# Patient Record
Sex: FEMALE | Race: WHITE | NOT HISPANIC OR LATINO | Employment: OTHER | URBAN - METROPOLITAN AREA
[De-identification: names, ages, dates, MRNs, and addresses within clinical notes are randomized per-mention and may not be internally consistent; named-entity substitution may affect disease eponyms.]

---

## 2017-01-27 ENCOUNTER — OFFICE VISIT - HEALTHEAST (OUTPATIENT)
Dept: INTERNAL MEDICINE | Facility: CLINIC | Age: 62
End: 2017-01-27

## 2017-01-27 DIAGNOSIS — J20.9 BRONCHITIS, ACUTE: ICD-10-CM

## 2017-01-27 ASSESSMENT — MIFFLIN-ST. JEOR: SCORE: 1619.97

## 2017-02-02 ENCOUNTER — COMMUNICATION - HEALTHEAST (OUTPATIENT)
Dept: INTERNAL MEDICINE | Facility: CLINIC | Age: 62
End: 2017-02-02

## 2017-02-06 ENCOUNTER — COMMUNICATION - HEALTHEAST (OUTPATIENT)
Dept: INTERNAL MEDICINE | Facility: CLINIC | Age: 62
End: 2017-02-06

## 2017-03-06 ENCOUNTER — COMMUNICATION - HEALTHEAST (OUTPATIENT)
Dept: INTERNAL MEDICINE | Facility: CLINIC | Age: 62
End: 2017-03-06

## 2017-03-07 ENCOUNTER — OFFICE VISIT - HEALTHEAST (OUTPATIENT)
Dept: INTERNAL MEDICINE | Facility: CLINIC | Age: 62
End: 2017-03-07

## 2017-03-07 DIAGNOSIS — F43.29 GRIEF REACTION WITH PROLONGED BEREAVEMENT: ICD-10-CM

## 2017-03-07 DIAGNOSIS — M17.0 OSTEOARTHRITIS OF KNEES, BILATERAL: ICD-10-CM

## 2017-03-07 DIAGNOSIS — R03.0 ELEVATED BLOOD PRESSURE (NOT HYPERTENSION): ICD-10-CM

## 2017-03-07 DIAGNOSIS — F43.22 ADJUSTMENT DISORDER WITH ANXIETY: ICD-10-CM

## 2017-03-07 DIAGNOSIS — F41.1 ANXIETY STATE: ICD-10-CM

## 2017-03-15 ENCOUNTER — RECORDS - HEALTHEAST (OUTPATIENT)
Dept: ADMINISTRATIVE | Facility: OTHER | Age: 62
End: 2017-03-15

## 2017-04-25 ENCOUNTER — RECORDS - HEALTHEAST (OUTPATIENT)
Dept: ADMINISTRATIVE | Facility: OTHER | Age: 62
End: 2017-04-25

## 2017-07-24 ENCOUNTER — HOSPITAL ENCOUNTER (OUTPATIENT)
Dept: MAMMOGRAPHY | Facility: CLINIC | Age: 62
Discharge: HOME | End: 2017-07-24
Attending: OBSTETRICS & GYNECOLOGY | Admitting: OBSTETRICS & GYNECOLOGY
Payer: COMMERCIAL

## 2017-07-24 DIAGNOSIS — Z12.31 VISIT FOR SCREENING MAMMOGRAM: ICD-10-CM

## 2017-07-24 PROCEDURE — 77063 BREAST TOMOSYNTHESIS BI: CPT

## 2017-07-24 PROCEDURE — G0202 SCR MAMMO BI INCL CAD: HCPCS

## 2017-08-07 ENCOUNTER — COMMUNICATION - HEALTHEAST (OUTPATIENT)
Dept: INTERNAL MEDICINE | Facility: CLINIC | Age: 62
End: 2017-08-07

## 2017-08-10 ENCOUNTER — OFFICE VISIT - HEALTHEAST (OUTPATIENT)
Dept: INTERNAL MEDICINE | Facility: CLINIC | Age: 62
End: 2017-08-10

## 2017-08-10 DIAGNOSIS — E55.9 VITAMIN D DEFICIENCY: ICD-10-CM

## 2017-08-10 DIAGNOSIS — Z00.00 HEALTH CARE MAINTENANCE: ICD-10-CM

## 2017-08-10 DIAGNOSIS — F43.23 ADJUSTMENT DISORDER WITH MIXED ANXIETY AND DEPRESSED MOOD: ICD-10-CM

## 2017-08-10 DIAGNOSIS — R41.3 MEMORY IMPAIRMENT: ICD-10-CM

## 2017-08-10 LAB
CHOLEST SERPL-MCNC: 232 MG/DL
FASTING STATUS PATIENT QL REPORTED: NO
HDLC SERPL-MCNC: 79 MG/DL
LDLC SERPL CALC-MCNC: 126 MG/DL
TRIGL SERPL-MCNC: 137 MG/DL

## 2017-08-10 ASSESSMENT — MIFFLIN-ST. JEOR: SCORE: 1608.63

## 2017-08-11 ENCOUNTER — COMMUNICATION - HEALTHEAST (OUTPATIENT)
Dept: INTERNAL MEDICINE | Facility: CLINIC | Age: 62
End: 2017-08-11

## 2017-08-14 ENCOUNTER — COMMUNICATION - HEALTHEAST (OUTPATIENT)
Dept: INTERNAL MEDICINE | Facility: CLINIC | Age: 62
End: 2017-08-14

## 2017-08-28 ENCOUNTER — COMMUNICATION - HEALTHEAST (OUTPATIENT)
Dept: INTERNAL MEDICINE | Facility: CLINIC | Age: 62
End: 2017-08-28

## 2017-08-28 DIAGNOSIS — F41.9 ANXIETY: ICD-10-CM

## 2017-11-20 DIAGNOSIS — Z78.0 MENOPAUSE: ICD-10-CM

## 2017-11-20 NOTE — TELEPHONE ENCOUNTER
Last annual visit 8/24/16  Breast exam at visit.  Last prescription 8/24/16  Last mammo 7/2017    Future appointment currently not scheduled.    BP Readings from Last 3 Encounters:   08/24/16 129/62   02/20/15 128/61   01/31/14 120/70     Medication is being filled for 1 time refill only due to:  Patient needs to be seen because it has been more than one year since last visit.     Kelli Baumann   [facility]  RN

## 2018-01-26 DIAGNOSIS — Z78.0 MENOPAUSE: ICD-10-CM

## 2018-01-26 NOTE — TELEPHONE ENCOUNTER
Last annual visit 8/24/16  Breast exam at visit.  Last prescription 8/24/16  Last mammo 7/2017     Future appointment currently not scheduled.    BP Readings from Last 2 Encounters:   08/24/16 129/62   02/20/15 128/61     Chelsi refill given.  Patient did not follow up.    Will send to provider for consideration of refill.    Kelli Baumann   [facility]  RN

## 2018-01-29 ENCOUNTER — COMMUNICATION - HEALTHEAST (OUTPATIENT)
Dept: INTERNAL MEDICINE | Facility: CLINIC | Age: 63
End: 2018-01-29

## 2018-01-29 DIAGNOSIS — F41.9 ANXIETY: ICD-10-CM

## 2018-02-06 ENCOUNTER — COMMUNICATION - HEALTHEAST (OUTPATIENT)
Dept: INTERNAL MEDICINE | Facility: CLINIC | Age: 63
End: 2018-02-06

## 2018-03-16 ENCOUNTER — AMBULATORY - HEALTHEAST (OUTPATIENT)
Dept: INTERNAL MEDICINE | Facility: CLINIC | Age: 63
End: 2018-03-16

## 2018-03-16 ENCOUNTER — COMMUNICATION - HEALTHEAST (OUTPATIENT)
Dept: SCHEDULING | Facility: CLINIC | Age: 63
End: 2018-03-16

## 2018-03-16 DIAGNOSIS — J32.9 SINUS INFECTION: ICD-10-CM

## 2018-03-21 ENCOUNTER — COMMUNICATION - HEALTHEAST (OUTPATIENT)
Dept: SCHEDULING | Facility: CLINIC | Age: 63
End: 2018-03-21

## 2018-03-21 DIAGNOSIS — R06.2 WHEEZING: ICD-10-CM

## 2018-05-07 ENCOUNTER — COMMUNICATION - HEALTHEAST (OUTPATIENT)
Dept: INTERNAL MEDICINE | Facility: CLINIC | Age: 63
End: 2018-05-07

## 2018-05-07 ENCOUNTER — OFFICE VISIT - HEALTHEAST (OUTPATIENT)
Dept: INTERNAL MEDICINE | Facility: CLINIC | Age: 63
End: 2018-05-07

## 2018-05-07 DIAGNOSIS — F41.9 ANXIETY: ICD-10-CM

## 2018-05-07 DIAGNOSIS — Z00.00 HEALTH CARE MAINTENANCE: ICD-10-CM

## 2018-05-07 DIAGNOSIS — Z12.31 VISIT FOR SCREENING MAMMOGRAM: ICD-10-CM

## 2018-05-07 DIAGNOSIS — I83.90 VARICOSE VEIN OF LEG: ICD-10-CM

## 2018-05-07 LAB
ALBUMIN UR-MCNC: NEGATIVE MG/DL
ANION GAP SERPL CALCULATED.3IONS-SCNC: 13 MMOL/L (ref 5–18)
APPEARANCE UR: CLEAR
BILIRUB UR QL STRIP: NEGATIVE
BUN SERPL-MCNC: 11 MG/DL (ref 8–22)
CALCIUM SERPL-MCNC: 9.4 MG/DL (ref 8.5–10.5)
CHLORIDE BLD-SCNC: 102 MMOL/L (ref 98–107)
CHOLEST SERPL-MCNC: 250 MG/DL
CO2 SERPL-SCNC: 24 MMOL/L (ref 22–31)
COLOR UR AUTO: YELLOW
CREAT SERPL-MCNC: 0.72 MG/DL (ref 0.6–1.1)
ERYTHROCYTE [DISTWIDTH] IN BLOOD BY AUTOMATED COUNT: 12.5 % (ref 11–14.5)
FASTING STATUS PATIENT QL REPORTED: YES
GFR SERPL CREATININE-BSD FRML MDRD: >60 ML/MIN/1.73M2
GLUCOSE BLD-MCNC: 82 MG/DL (ref 70–125)
GLUCOSE UR STRIP-MCNC: NEGATIVE MG/DL
HCT VFR BLD AUTO: 40.5 % (ref 35–47)
HDLC SERPL-MCNC: 93 MG/DL
HGB BLD-MCNC: 13.4 G/DL (ref 12–16)
HGB UR QL STRIP: NEGATIVE
KETONES UR STRIP-MCNC: NEGATIVE MG/DL
LDLC SERPL CALC-MCNC: 128 MG/DL
LEUKOCYTE ESTERASE UR QL STRIP: NEGATIVE
MCH RBC QN AUTO: 28.6 PG (ref 27–34)
MCHC RBC AUTO-ENTMCNC: 33.1 G/DL (ref 32–36)
MCV RBC AUTO: 86 FL (ref 80–100)
NITRATE UR QL: NEGATIVE
PH UR STRIP: 6 [PH] (ref 5–8)
PLATELET # BLD AUTO: 266 THOU/UL (ref 140–440)
PMV BLD AUTO: 7.6 FL (ref 7–10)
POTASSIUM BLD-SCNC: 4.2 MMOL/L (ref 3.5–5)
RBC # BLD AUTO: 4.68 MILL/UL (ref 3.8–5.4)
SODIUM SERPL-SCNC: 139 MMOL/L (ref 136–145)
SP GR UR STRIP: 1.02 (ref 1–1.03)
TRIGL SERPL-MCNC: 143 MG/DL
TSH SERPL DL<=0.005 MIU/L-ACNC: 2.22 UIU/ML (ref 0.3–5)
UROBILINOGEN UR STRIP-ACNC: NORMAL
WBC: 6.9 THOU/UL (ref 4–11)

## 2018-05-07 ASSESSMENT — MIFFLIN-ST. JEOR: SCORE: 1630.46

## 2018-05-15 ENCOUNTER — COMMUNICATION - HEALTHEAST (OUTPATIENT)
Dept: INTERNAL MEDICINE | Facility: CLINIC | Age: 63
End: 2018-05-15

## 2018-05-15 DIAGNOSIS — K91.1 DUMPING SYNDROME: ICD-10-CM

## 2018-06-01 ENCOUNTER — COMMUNICATION - HEALTHEAST (OUTPATIENT)
Dept: SCHEDULING | Facility: CLINIC | Age: 63
End: 2018-06-01

## 2018-06-01 DIAGNOSIS — N39.0 UTI (URINARY TRACT INFECTION): ICD-10-CM

## 2018-06-18 ENCOUNTER — RECORDS - HEALTHEAST (OUTPATIENT)
Dept: ADMINISTRATIVE | Facility: OTHER | Age: 63
End: 2018-06-18

## 2018-07-03 ENCOUNTER — OFFICE VISIT (OUTPATIENT)
Dept: OBGYN | Facility: CLINIC | Age: 63
End: 2018-07-03
Payer: COMMERCIAL

## 2018-07-03 VITALS
WEIGHT: 245 LBS | SYSTOLIC BLOOD PRESSURE: 150 MMHG | TEMPERATURE: 97.9 F | BODY MASS INDEX: 39.37 KG/M2 | DIASTOLIC BLOOD PRESSURE: 70 MMHG | HEIGHT: 66 IN | HEART RATE: 58 BPM | RESPIRATION RATE: 18 BRPM

## 2018-07-03 DIAGNOSIS — R10.2 PELVIC PRESSURE IN FEMALE: Primary | ICD-10-CM

## 2018-07-03 DIAGNOSIS — R63.8 INCREASED BMI: ICD-10-CM

## 2018-07-03 DIAGNOSIS — Z79.890 HORMONE REPLACEMENT THERAPY (POSTMENOPAUSAL): ICD-10-CM

## 2018-07-03 DIAGNOSIS — R33.9 INCOMPLETE BLADDER EMPTYING: ICD-10-CM

## 2018-07-03 PROCEDURE — 99213 OFFICE O/P EST LOW 20 MIN: CPT | Performed by: OBSTETRICS & GYNECOLOGY

## 2018-07-03 RX ORDER — ESCITALOPRAM OXALATE 20 MG/1
10 TABLET ORAL AT BEDTIME
COMMUNITY

## 2018-07-03 NOTE — PROGRESS NOTES
"63 year old  here for feeling \"mushy\" in side.  She noticed things didn't feel right with intercourse starting last month.  That is the only time that she has noticed it.  He is also having health issues so don't know if that is him.  Normal bowel movements.  Feels somewhat incomplete emptying.  She just went on lexapro a year ago.  She is still on the premarin.  She is on the premarin for the vaginal dryness.    Past Medical History:   Diagnosis Date     Post-menopause on HRT (hormone replacement therapy)      Past Surgical History:   Procedure Laterality Date     GALLBLADDER SURGERY       HYSTERECTOMY, PAP NO LONGER INDICATED      menorrhagia-still has ovaries      meds-reviewed  FH and social history reviewed in EPIC   ROS: 10 point ROS neg other than the symptoms noted above in the HPI.  /70 (BP Location: Left arm, Patient Position: Chair, Cuff Size: Adult Large)  Pulse 58  Temp 97.9  F (36.6  C) (Tympanic)  Resp 18  Ht 5' 6\" (1.676 m)  Wt 245 lb (111.1 kg)  BMI 39.54 kg/m2  Alert and orientedx3, in NAD  Abdomen-soft,nontender, NABS  Pelvic exam: BUS-normal normal vagina and vulva, post hysterectomy status, vaginal cuff well elevated, obesity limits pelvic exam. Rectovaginal with no masses and excellent perineal body support  POPPQ Aa-3 Ba-2 C-7 Ap-1 Bp-2 GH3cm PB2cm  ASSESSMENT / PLAN:  (R10.2) Pelvic pressure in female  (primary encounter diagnosis)  Comment: normal bladder and no other urinary symptoms  Plan: MEASURE POST-VOID RESIDUAL URINE/BLADDER         CAPACITY, US NON-IMAGING, Escitalopram Oxalate         (LEXAPRO PO)        Stage 1 prolapse, not bothering the patient.      (R33.9) Incomplete bladder emptying  Comment: normal  Plan: MEASURE POST-VOID RESIDUAL URINE/BLADDER         CAPACITY, US NON-IMAGING, Escitalopram Oxalate         (LEXAPRO PO)            (R63.8) Increased BMI  Comment: discussed exercise and weight loss and diet.  Given information and given " referral  Plan: MEASURE POST-VOID RESIDUAL URINE/BLADDER         CAPACITY, US NON-IMAGING, Escitalopram Oxalate         (LEXAPRO PO), WEIGHT MANAGEMENT/ P LIFESTYLE         PROGRAM REFERRAL          (Z79.890) Hormone replacement therapy (postmenopausal)  Comment: discussed the risks, recommended if for vaginal dryness to switch to vaginal cream/ring or try off of it.  She declined. Discussed she could switch and use the pill 2 times per week vaginally and that would be enough to help her vaginally dryness and decrease her health risks from the estrogen  Plan: she will consider  20 minutes was spent face to face with the patient today discussing her history, diagnosis, and follow-up plan as noted above.  Over 50% of the visit was spent in counseling and coordination of care.    Total Visit Time: 20 minutes.    Danae Restrepo MD

## 2018-07-03 NOTE — MR AVS SNAPSHOT
"              After Visit Summary   7/3/2018    Rachana Quinn    MRN: [id number]           Patient Information     Date Of Birth          1955        Visit Information        Provider Department      7/3/2018 2:30 PM Danae Restrepo MD [facility]        Today's Diagnoses     Pelvic pressure in female    -  1    Incomplete bladder emptying        Increased BMI        Hormone replacement therapy (postmenopausal)           Follow-ups after your visit        Additional Services     WEIGHT MANAGEMENT/ UMP LIFESTYLE PROGRAM REFERRAL       To schedule an appointment, please call [facility] at [phone].                  Who to contact     If you have questions or need follow up information about today's clinic visit or your schedule please contact [facility] directly at [phone].  Normal or non-critical lab and imaging results will be communicated to you by MyChart, letter or phone within 4 business days after the clinic has received the results. If you do not hear from us within 7 days, please contact the clinic through MyChart or phone. If you have a critical or abnormal lab result, we will notify you by phone as soon as possible.  Submit refill requests through [employer] or call your pharmacy and they will forward the refill request to us. Please allow 3 business days for your refill to be completed.          Additional Information About Your Visit        Care EveryWhere ID     This is your Care EveryWhere ID. This could be used by other organizations to access your [place] medical records  [id number]        Your Vitals Were     Pulse Temperature Respirations Height BMI (Body Mass Index)       58 97.9  F (36.6  C) (Tympanic) 18 5' 6\" (1.676 m) 39.54 kg/m2        Blood Pressure from Last 3 Encounters:   07/03/18 150/70   08/24/16 129/62   02/20/15 128/61    Weight from Last 3 Encounters:   07/03/18 245 lb (111.1 kg)   08/24/16 224 lb 12.8 oz (102 kg) "   02/20/15 229 lb (103.9 kg)              We Performed the Following     MEASURE POST-VOID RESIDUAL URINE/BLADDER CAPACITY, US NON-IMAGING     WEIGHT MANAGEMENT/ UMP LIFESTYLE PROGRAM REFERRAL        Primary Care Provider Office Phone # Fax #    April Leo [phone]       [facility] [address] [facility] [address]        Equal Access to Services     DARYL JEFFREY : Hadii aad ku hadasho Soomaali, waaxda luqadaha, qaybta kaalmada adeegyada, waxay idiin hayaan adeeg kharash la'aan . So [facility] [phone].    ATENCIÓN: Si habla español, tiene a guzmán disposición servicios gratuitos de asistencia lingüística. Rileyame al [phone].    We comply with applicable federal civil rights laws and [place] laws. We do not discriminate on the basis of race, color, national origin, age, disability, sex, sexual orientation, or gender identity.            Thank you!     Thank you for choosing [facility]  for your care. Our goal is always to provide you with excellent care. Hearing back from our patients is one way we can continue to improve our services. Please take a few minutes to complete the written survey that you may receive in the mail after your visit with us. Thank you!             Your Updated Medication List - Protect others around you: Learn how to safely use, store and throw away your medicines at www.disposemymeds.org.          This list is accurate as of 7/3/18  3:21 PM.  Always use your most recent med list.                   Brand Name Dispense Instructions for use Diagnosis    ciclopirox 8 % Soln     1 Bottle    Apply daily, wash off every week        Ciclopirox Olamine 0.77 % Susp     1 Bottle    Externally apply 1 Application topically daily    Onychomycosis       estrogens (conjugated) 0.45 MG tablet    PREMARIN    90 tablet    Take 1 tablet (0.45 mg) by mouth daily    Menopause       LEXAPRO PO      Take 5 mg by mouth    Pelvic pressure in female, Incomplete  bladder emptying, Increased BMI       nystatin-triamcinolone cream    MYCOLOG II    30 g    Apply to areas of irritation twice daily for 2 weeks    Dermatitis, Encounter for gynecological examination without abnormal finding, Menopausal syndrome (hot flashes)       VITAMIN B COMPLEX PO           VITAMIN D PO

## 2019-02-06 ENCOUNTER — COMMUNICATION - HEALTHEAST (OUTPATIENT)
Dept: INTERNAL MEDICINE | Facility: CLINIC | Age: 64
End: 2019-02-06

## 2019-02-06 DIAGNOSIS — F41.9 ANXIETY: ICD-10-CM

## 2021-05-26 ENCOUNTER — RECORDS - HEALTHEAST (OUTPATIENT)
Dept: ADMINISTRATIVE | Facility: CLINIC | Age: 66
End: 2021-05-26

## 2021-05-28 ENCOUNTER — RECORDS - HEALTHEAST (OUTPATIENT)
Dept: ADMINISTRATIVE | Facility: CLINIC | Age: 66
End: 2021-05-28

## 2021-05-30 ENCOUNTER — RECORDS - HEALTHEAST (OUTPATIENT)
Dept: ADMINISTRATIVE | Facility: CLINIC | Age: 66
End: 2021-05-30

## 2021-05-30 VITALS — BODY MASS INDEX: 34.96 KG/M2 | WEIGHT: 229.9 LBS

## 2021-05-30 VITALS — BODY MASS INDEX: 34.63 KG/M2 | WEIGHT: 228.5 LBS | HEIGHT: 68 IN

## 2021-05-31 ENCOUNTER — RECORDS - HEALTHEAST (OUTPATIENT)
Dept: ADMINISTRATIVE | Facility: CLINIC | Age: 66
End: 2021-05-31

## 2021-05-31 VITALS — HEIGHT: 68 IN | WEIGHT: 226 LBS | BODY MASS INDEX: 34.25 KG/M2

## 2021-06-01 VITALS — WEIGHT: 236.06 LBS | BODY MASS INDEX: 37.05 KG/M2 | HEIGHT: 67 IN

## 2021-06-02 ENCOUNTER — RECORDS - HEALTHEAST (OUTPATIENT)
Dept: ADMINISTRATIVE | Facility: CLINIC | Age: 66
End: 2021-06-02

## 2021-06-08 NOTE — PROGRESS NOTES
"ASSESSMENT:  1.  Bronchitis:  Viral versus bacterial.  Since symptoms are quite severe with purulent sputum, she will be covered with an antibiotic    PLAN:  1.  Nasal smear for influenza was done.  This returned negative.  2.  Zithromax Z-Leslie  3.  Tessalon 200 mg 3 times daily as needed for cough  4.  Call for wheezing, high fevers, or if not improving in 1 week    Orders Placed This Encounter   Procedures     Influenza A/B Rapid Test     There are no discontinued medications.        ASSESSED PROBLEMS:  1. Bronchitis, acute  Influenza A/B Rapid Test    azithromycin (ZITHROMAX Z-LESLIE) 250 MG tablet    benzonatate (TESSALON) 200 MG capsule       CHIEF COMPLAINT:  Chief Complaint   Patient presents with     Cough     for past week non productive, denies fevers       HISTORY OF PRESENT ILLNESS:  Rachana is a 61 y.o. female presenting to the clinic today with concerns of bronchitis. She is normally followed by Dr. Bailey. She states that for the last week she has had a dry, nonproductive cough. She also has complaints of plugged ears and wheezing, especially at night. She has been trying to drink more fluids and has been using tylenol to manage symptoms. She has also been using Afrin nose spray for a few days. She is allergic to codeine.     REVIEW OF SYSTEMS:   She denies any fevers.   All other systems are negative.    PFSH:  She is a nonsmoker.     TOBACCO USE:  History   Smoking Status     Never Smoker   Smokeless Tobacco     Not on file       VITALS:  Vitals:    01/27/17 1059   BP: 124/70   Patient Site: Left Arm   Patient Position: Sitting   Cuff Size: Adult Regular   Pulse: 80   Temp: 97.6  F (36.4  C)   TempSrc: Tympanic   Weight: (!) 228 lb 8 oz (103.6 kg)   Height: 5' 8\" (1.727 m)     Wt Readings from Last 3 Encounters:   01/27/17 (!) 228 lb 8 oz (103.6 kg)   11/11/15 (!) 224 lb 3.2 oz (101.7 kg)   05/06/15 (!) 225 lb 4.8 oz (102.2 kg)       PHYSICAL EXAM:  Constitutional:   Reveals an alert, pleasant " talkative female with frequent harsh cough and slightly hoarse voice.  Vitals: per nursing notes.  HEENT:  Atraumatic. Ears:  External canals, TMs clear.    Eyes:  No conjunctival hyperemia.   Oropharynx:   Mouth and throat clear, no thrush or exudate.  Neck:  Supple, no carotid bruits or adenopathy.  Back:  No spine or CVA pain.  Thorax:  No bony deformities.  Lungs: Clear to A&P aside from scant rhonchi on forced expiration. No focal rales.   Neuro:  Alert and oriented. Cranial nerves, motor, sensory exams are intact.  No gross focal deficits.  Psychiatric:  Memory intact, mood appropriate.    ADDITIONAL HISTORY SUMMARIZED (2): None.  DECISION TO OBTAIN EXTRA INFORMATION (1): None.   RADIOLOGY TESTS (1): None.  LABS (1): Labs ordered.  MEDICINE TESTS (1): None.  INDEPENDENT REVIEW (2 each): None.     The visit lasted a total of 10 minutes face to face with the patient. Over 50% of the time was spent counseling and educating the patient about bronchitis.    IValdemar, am scribing for and in the presence of, Dr. Washburn.    I, Dr. Washburn, personally performed the services described in this documentation, as scribed by Valdemar Sharp in my presence, and it is both accurate and complete.    Dragon dictation was used for this note.  Speech recognition errors are a possibility.    MEDICATIONS:  Current Outpatient Prescriptions   Medication Sig Dispense Refill     ESTROGENS, CONJUGATED (PREMARIN ORAL) Take 0.45 mg by mouth.        nystatin-triamcinolone (MYCOLOG II) cream        temazepam (RESTORIL) 15 mg capsule TAKE 1-2 CAPSULES AT BEDTIME AS NEEDED FOR SLEEP 90 capsule 0     azithromycin (ZITHROMAX Z-LESLIE) 250 MG tablet Take 2 tablets (500 mg) on  Day 1,  followed by 1 tablet (250 mg) once daily on Days 2 through 5. 6 tablet 0     benzonatate (TESSALON) 200 MG capsule Take 1 capsule (200 mg total) by mouth 3 (three) times a day as needed for cough. 20 capsule 0     No current facility-administered medications  for this visit.        Total data points: 1.

## 2021-06-09 NOTE — PROGRESS NOTES
[facility] Follow Up Note  Assessment/Plan  1. Anxiety     2. Elevated blood pressure (not hypertension)     3. Osteoarthritis of knees, bilateral     4. Grief reaction with prolonged bereavement     5. Adjustment disorder with anxiety           Patient Instructions   1. Medications were reviewed and medication refills completed if requested.   A corrected Medication List is listed below on this After Visit    Summary.   New Medications, Refilled medications or Discontinued medications are also listed below.      2. Immunizations were reviewed and updated as necessary.   All up to date  3. If labs were done today, they will either be mailed to you or released to My Chart online if that has been activated.  4. Monitor BP at home and bring in multiple blood pressures.  5.  It is okay to proceed with corticosteroid injection in your knee next week.  That may have a minor effect on blood pressure but not worrisome.  6.  See your PCP in a month for blood pressure check and final decision about drug treatment.  She has had uniformly good blood pressures in clinic before today and due to her stress level today we decided to defer initiating blood pressure treatment until we know for sure that she needs it.  7.  Because of her grief reaction with prolonged treatment we did a PHQ-9 today.  It was her impression that she does not have significant depression in the PHQ-9 was consistent with that since her score was 5.    MORE THAN 30 MINUTES WAS SPENT WITH THE PATIENT TODAY OF WHICH GREATER THAN 50% WAS SPENT IN COUNSELING AND COORDINATION OF CARE -  DISCUSSING THE AFOREMENTIONED DIAGNOSES, TREATMENT, AND PLAN.           Body mass index is 34.96 kg/(m^2).    Jean Carlos Figueroa MD  Internal Medicine [place] [address]  Voice: [phone]  Fax: [phone]    +++++++++++++++++++++++++++++++++++++++    Rachana Quinn   62 y.o. female    Date of Visit:  3/7/2017    Chief Complaint   Patient presents with     Hypertension     Subjective  1. Health Maintenance  - she gets her mammogram and Pap smear also through her gynecologist's office.    2.  Elevated blood pressure without hypertension - previous blood pressures at this clinic have uniformly been good.  Today we have elevated blood pressures and she brings in blood pressures for the last 3 days that are elevated.  On  her blood pressure is 161/87 and 179/93.  On Monday morning her blood pressure was 193/85 and then later was 153/82.  On Tuesday her blood pressure at home is 130/80.    Initial blood pressure at this clinic today was 144/58.  Repeat blood pressure by the nurse was 136/60.  Right arm blood pressure by me with a small cuff was 140/62.  Left arm blood pressure by me was 144/58.  Patient admits that on  nights in Monday morning she gets very stressed out about going to work.  She's also been stressed out about on Friday of family problems enumerated below.    3.  Adjustment disorder with anxiety and prolonged bereavement  -the patient is  and lives with her .  The urine go her 92-year-old father-in-law and mother-in-law moved in.  Her father-in-law subsequently .  Her mother-in-law has mild dementia.  Also within the last year or 236-year-old daughter  of complications of lupus.  She then became the guardian for her 18-year-old granddaughter was about 15 or 17 at that time.  Her granddaughter is basically a good kid but at age 18 of course requires a lot of work includes a lot of stress.  The patient herself has a demanding job for which she wants to retire.  She does not think that she is depressed and she did a PHQ-9 today and scored 5.  She does think that she has significant problems with anxiety especially with reference to her job and her stressors.  She thinks that that has been particularly onerous recently.    4.  Osteoarthritis of the knees - she had  particular problems with her left knee has been having severe pain in the left knee.  Next week she is going to see orthopedics and get a steroid injection in her knee.  She is worried that the steroid injection could cause elevation of blood pressure which occurred to a small extent.  However she should not skip the shot is it's possible the pain is current.  Her blood pressure as well.    5.  Family history of heart disease - she is worried about heart disease because her father  of a heart attack at 63.  The patient herself has superb cholesterol and HDL greater than 100.  She is a nonsmoker who never smoked.  She has up to this point never had hypertension.  Therefore we would not think that she is a high risk for premature coronary disease because her risk factors are basically just obesity and now recent borderline blood pressure.  She was worried about that and is reassured.  Reviewed her records from the [email or web address] system and see that she has superb lipid panels last 3 years.        ROS A comprehensive review of systems was performed and was negative other than the problems noted above.    Medications, allergies, and problem list were reviewed and updated    No past medical history on file.  Past Surgical History:   Procedure Laterality Date     ID REMOVAL GALLBLADDER      Description: Cholecystectomy;  Recorded: 2008;     ID TOTAL ABDOM HYSTERECTOMY      Description: Hysterectomy;  Recorded: 2008;     Social History     Social History     Marital status:      Spouse name: N/A     Number of children: N/A     Years of education: N/A     Occupational History     Not on file.     Social History Main Topics     Smoking status: Never Smoker     Smokeless tobacco: Not on file     Alcohol use Not on file     Drug use: Not on file     Sexual activity: Not on file     Other Topics Concern     Not on file     Social History Narrative    She is in her second marriage. She had two biological  children, her son completed suicide at age 16. She has 4 stepchildren and overall there are 9 grandchildren.   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  She does not smoke cigarettes, rarely drinks alcohol.     Family History   Problem Relation Age of Onset     Heart attack Father       age 63     Multiple sclerosis Mother       age 69     Depression Son      suicide age 16     Lupus Daughter        Current Outpatient Prescriptions   Medication Sig Dispense Refill     ESTROGENS, CONJUGATED (PREMARIN ORAL) Take 0.45 mg by mouth.        nystatin-triamcinolone (MYCOLOG II) cream        temazepam (RESTORIL) 15 mg capsule TAKE 1-2 CAPSULES AT BEDTIME AS NEEDED FOR SLEEP 90 capsule 0     No current facility-administered medications for this visit.        Allergies   Allergen Reactions     Morphine Shortness Of Breath and Rash     Aspirin (Tartrazine Only)      Codeine      Penicillins        Exam  Vitals:    17 1555   BP: 136/60   Pulse:      Right arm blood pressure 140/62 with a small cuff.  Left arm blood pressure 144/58 with a small cuff.  A small cuff appears to be the correct size.  The patient is well-groomed and well-dressed alert and oriented ×3.   At times when talking about her stressors she does breakdown and cry.  Especially when she talks about 36-year-old daughter dying.  Head: Negative  CHEST: Auscultation no rales or wheezing.  CAR: Normal sinus rhythm with no ectopy and no murmur.    Extremities: No peripheral edema.  Pulses are normal and symmetrical.  Neuro: Gait and mentation normal. Cranial Nerves intact.            Jean Carlos Figueroa MD

## 2021-06-12 NOTE — PROGRESS NOTES
[facility]    Rachana Quinn   62 y.o. female    Date of Visit: 8/10/2017    Chief Complaint   Patient presents with     Aphasia     Subjective  Rachana comes in today as she has been feeling increasingly stressed and anxious.  She does not feel like she is grieved properly since her daughter  2 years ago.  She has been having to help care for her grandchild.  They also recently moved.  She feels anxious and depressed.  Sometimes she will start a conversation in a little way into the conversation she will stop and try to figure out what to say.  She sometimes will have the wrong direction in mind when driving somewhere, she does not sleep well at night and it takes her a lot longer to make decisions.  She cries a lot more frequently.    ROS A comprehensive review of systems was performed and was otherwise negative    Social History:   Social History     Social History Narrative    She is in her second marriage. She had two children, both are deceseasd. She has 4 stepchildren and overall there are 9 grandchildren.   She is retiring from  for [employer] ().  She does not smoke cigarettes, rarely drinks alcohol.       Medications were reconciled.  Allergies, social and family history, and the problem list were all reviewed and updated.      Exam  General Appearance: Tearful when talking about her problems.  Vitals:    08/10/17 0959   BP: 124/72   Pulse: 62   Resp: 12   SpO2: 98%   Weight: (!) 226 lb (102.5 kg)   Height: 5' 8\" (1.727 m)      Body mass index is 34.36 kg/(m^2).  Wt Readings from Last 3 Encounters:   08/10/17 (!) 226 lb (102.5 kg)   17 (!) 229 lb 14.4 oz (104.3 kg)   17 (!) 228 lb 8 oz (103.6 kg)     HEENT: Sclera are clear.   Lungs: Normal respirations  Abdomen: Soft and nondistended  Extremities: No edema  Skin: No rashes  Neuro: Moves all extremities and has facial symmetry  Gait: Ambulates with a normal gait    Assessment/Plan  1. Adjustment disorder with " mixed anxiety and depressed mood  Some time discussing this.  I suspect she is suffering more from depression and anxiety rather than a true cognitive problem.  We will treat her for this and see how she is doing.  If she still experiences more cognitive issues will need to do further workup.  We will start her on escitalopram 5 mg daily and trazodone at night for sleep.    2. Memory impairment  As above.  - Basic Metabolic Panel  - HM2(CBC w/o Differential)  - Thyroid Stimulating Hormone (TSH)  - Urinalysis-UC if Indicated  - Vitamin B12    3. Vitamin D deficiency  We will check a level today.  - Vitamin D, Total (25-Hydroxy)    4. Health care maintenance  She is due for a physical.  Colonoscopy referral is made..  - Lipid [place]  - Ambulatory referral for Colonoscopy          April D MD Leo  8/10/2017    Much or all of the text in this note was generated through the use of Dragon Dictate voice-to-text software. Errors in spelling or words which seem out of context are unintentional. Sound alike errors, in particular, may have escaped editing.

## 2021-06-17 NOTE — PROGRESS NOTES
"Chief complaint: Here for a physical    History of present illness:   Rachana comes in today for general physical.  She feels fairly well for the most part.  She is going to be working on trying to lose some weight.  She is exercising daily.  She does have some vaginal discomfort on and off but would like to go down and her estradiol dose daily.    Social history:   Social History     Social History Narrative    She is in her second marriage. She had two children, both are deceseasd. She has 4 stepchildren and overall there are 9 grandchildren.   She is retired from [employer] for [employer] (2017).  She does not smoke cigarettes, rarely drinks alcohol.       Family history:    Family History   Problem Relation Age of Onset     Heart attack Father       age 63     Multiple sclerosis Mother       age 69     Depression Son      suicide age 16     Lupus Daughter       age 38 of vasculitis and complicated problems       Review of systems: See above.  The rest of the review of systems are negative for all systems.    Current allergies, medications, and problem list are all reviewed and updated in the chart.    Physical exam:  Vitals:    18 0947   BP: 136/86   Patient Site: Left Arm   Patient Position: Sitting   Cuff Size: Adult Large   Pulse: (!) 50   SpO2: 98%   Weight: (!) 236 lb 1 oz (107.1 kg)   Height: 5' 6.5\" (1.689 m)     Body mass index is 37.53 kg/(m^2).  General Appearance:  Alert, cooperative, no distress, appears stated age   Head:  Normocephalic, without obvious abnormality, atraumatic   Eyes:  PERRL, conjunctiva/corneas clear, EOM's intact   Ears:  Normal TM's and external ear canals, both ears   Nose: Nares normal, no drainage    Throat: Lips, mucosa, and tongue normal; teeth and gums normal   Neck: Supple, symmetrical, trachea midline, no adenopathy;  thyroid: not enlarged, symmetric, no tenderness/mass/nodules; no carotid bruit   Back:   Symmetric, no curvature, ROM normal   Lungs:   Clear to " auscultation bilaterally, respirations unlabored   Breasts:  No breast masses, tenderness, asymmetry, or nipple discharge.   Heart:  Regular rate and rhythm, S1 and S2 normal, no murmur, rub, or gallop   Abdomen:   Soft, non-tender, bowel sounds active, no masses, no organomegaly   Genitalia: Normally developed genitalia with no external lesions    Rectal:  No hemorrhoids   Extremities: Extremities normal, atraumatic, no cyanosis or edema   Skin: Skin color, texture, turgor normal, no rashes or lesions   Lymph nodes: Cervical, supraclavicular, and axillary nodes normal   Neurologic: Nonfocal with facial symmetry      Assessment and plan:  1. Health care maintenance  She chooses to Cologuard instead of a colonoscopy.  Mammogram due this year.  - Cologuard  - Basic Metabolic Panel  - HM2(CBC w/o Differential)  - Thyroid Stimulating Hormone (TSH)  - Urinalysis-UC if Indicated  - Lipid Blair    2. Visit for screening mammogram  - Mammo Screening Bilateral; Future    3. Anxiety  Doing well on Lexapro.  - escitalopram oxalate (LEXAPRO) 5 MG tablet; Take 1 tablet (5 mg total) by mouth daily.  Dispense: 90 tablet; Refill: 5    4. Varicose vein of leg  Insert encouraged her to use compression stockings daily.  If she still having symptoms and would like to see a vein specialist to let us know.    5.  Dumping syndrome  She describes a couple episodes of classic dumping syndrome since she has had a cholecystectomy.  She will let us know she wants to try cholestyramine, she like to hold off for now.            Shelia Bailey MD  Internal and Geriatric Medicine  [facility]  5/7/2018    [facility]  Much or all of the text in this note was generated through the use of Dragon Dictate voice-to-text software. Errors in spelling or words which seem out of context are unintentional.  Sound alike errors, in particular, may have escaped editing.

## 2021-06-23 NOTE — TELEPHONE ENCOUNTER
Former patient of Leo & has not established care with another provider.  Please assign refill request to covering provider per Clinic standard process.      Refill Approved    Rx renewed per Medication Renewal Policy. Medication was last renewed on 5/7/18.    Soraida Wu, [facility] Triage/Med Refill 2/7/2019     Requested Prescriptions   Pending Prescriptions Disp Refills     escitalopram oxalate (LEXAPRO) 5 MG tablet [Pharmacy Med Name: ESCITALOPRAM TAB 5MG] 90 tablet 3     Sig: TAKE 1 TABLET DAILY    [facility] Refill Protocol  Passed - 2/6/2019 12:17 AM       Passed - PCP or prescribing provider visit in last year    Last office visit with prescriber/PCP: 8/10/2017 Shelia Bailey MD OR same dept: Visit date not found OR same specialty: 8/10/2017 Shelia Bailey MD  Last physical: 5/7/2018 Last MTM visit: Visit date not found   Next visit within 3 mo: Visit date not found  Next physical within 3 mo: Visit date not found  Prescriber OR PCP: Shelia Bailey MD  Last diagnosis associated with med order: 1. Anxiety  - escitalopram oxalate (LEXAPRO) 5 MG tablet [Pharmacy Med Name: ESCITALOPRAM TAB 5MG]; TAKE 1 TABLET DAILY  Dispense: 90 tablet; Refill: 3    If protocol passes may refill for 12 months if within 3 months of last provider visit (or a total of 15 months).

## 2022-12-01 RX ORDER — GABAPENTIN 100 MG/1
200 CAPSULE ORAL AT BEDTIME
COMMUNITY

## 2022-12-03 ENCOUNTER — ANESTHESIA EVENT (OUTPATIENT)
Dept: SURGERY | Facility: CLINIC | Age: 67
End: 2022-12-03
Payer: COMMERCIAL

## 2022-12-05 ENCOUNTER — APPOINTMENT (OUTPATIENT)
Dept: RADIOLOGY | Facility: CLINIC | Age: 67
End: 2022-12-05
Attending: PHYSICIAN ASSISTANT
Payer: COMMERCIAL

## 2022-12-05 ENCOUNTER — HOSPITAL ENCOUNTER (OUTPATIENT)
Facility: CLINIC | Age: 67
Discharge: HOME | End: 2022-12-06
Attending: STUDENT IN AN ORGANIZED HEALTH CARE EDUCATION/TRAINING PROGRAM | Admitting: STUDENT IN AN ORGANIZED HEALTH CARE EDUCATION/TRAINING PROGRAM
Payer: COMMERCIAL

## 2022-12-05 ENCOUNTER — ANESTHESIA (OUTPATIENT)
Dept: SURGERY | Facility: CLINIC | Age: 67
End: 2022-12-05
Payer: COMMERCIAL

## 2022-12-05 DIAGNOSIS — S42.291A OTHER CLOSED DISPLACED FRACTURE OF PROXIMAL END OF RIGHT HUMERUS, INITIAL ENCOUNTER: Primary | ICD-10-CM

## 2022-12-05 PROBLEM — G25.81 RESTLESS LEG SYNDROME: Status: ACTIVE | Noted: 2022-12-05

## 2022-12-05 PROBLEM — F41.9 ANXIETY AND DEPRESSION: Status: ACTIVE | Noted: 2022-12-05

## 2022-12-05 PROBLEM — F32.A ANXIETY AND DEPRESSION: Status: ACTIVE | Noted: 2022-12-05

## 2022-12-05 PROCEDURE — 250N000025 HC SEVOFLURANE, PER MIN: Performed by: STUDENT IN AN ORGANIZED HEALTH CARE EDUCATION/TRAINING PROGRAM

## 2022-12-05 PROCEDURE — 250N000011 HC RX IP 250 OP 636: Performed by: ANESTHESIOLOGY

## 2022-12-05 PROCEDURE — 250N000009 HC RX 250: Performed by: NURSE ANESTHETIST, CERTIFIED REGISTERED

## 2022-12-05 PROCEDURE — 370N000017 HC ANESTHESIA TECHNICAL FEE, PER MIN: Performed by: STUDENT IN AN ORGANIZED HEALTH CARE EDUCATION/TRAINING PROGRAM

## 2022-12-05 PROCEDURE — C9290 INJ, BUPIVACAINE LIPOSOME: HCPCS | Performed by: ANESTHESIOLOGY

## 2022-12-05 PROCEDURE — 250N000013 HC RX MED GY IP 250 OP 250 PS 637: Performed by: FAMILY MEDICINE

## 2022-12-05 PROCEDURE — 250N000009 HC RX 250: Performed by: ANESTHESIOLOGY

## 2022-12-05 PROCEDURE — 250N000009 HC RX 250: Performed by: STUDENT IN AN ORGANIZED HEALTH CARE EDUCATION/TRAINING PROGRAM

## 2022-12-05 PROCEDURE — 710N000010 HC RECOVERY PHASE 1, LEVEL 2, PER MIN: Performed by: STUDENT IN AN ORGANIZED HEALTH CARE EDUCATION/TRAINING PROGRAM

## 2022-12-05 PROCEDURE — 250N000013 HC RX MED GY IP 250 OP 250 PS 637: Performed by: ANESTHESIOLOGY

## 2022-12-05 PROCEDURE — 999N000141 HC STATISTIC PRE-PROCEDURE NURSING ASSESSMENT: Performed by: STUDENT IN AN ORGANIZED HEALTH CARE EDUCATION/TRAINING PROGRAM

## 2022-12-05 PROCEDURE — 250N000011 HC RX IP 250 OP 636: Performed by: PHYSICIAN ASSISTANT

## 2022-12-05 PROCEDURE — 272N000001 HC OR GENERAL SUPPLY STERILE: Performed by: STUDENT IN AN ORGANIZED HEALTH CARE EDUCATION/TRAINING PROGRAM

## 2022-12-05 PROCEDURE — 99204 OFFICE O/P NEW MOD 45 MIN: CPT | Performed by: FAMILY MEDICINE

## 2022-12-05 PROCEDURE — 360N000077 HC SURGERY LEVEL 4, PER MIN: Performed by: STUDENT IN AN ORGANIZED HEALTH CARE EDUCATION/TRAINING PROGRAM

## 2022-12-05 PROCEDURE — 250N000013 HC RX MED GY IP 250 OP 250 PS 637: Performed by: PHYSICIAN ASSISTANT

## 2022-12-05 PROCEDURE — C1713 ANCHOR/SCREW BN/BN,TIS/BN: HCPCS | Performed by: STUDENT IN AN ORGANIZED HEALTH CARE EDUCATION/TRAINING PROGRAM

## 2022-12-05 PROCEDURE — 258N000003 HC RX IP 258 OP 636: Performed by: ANESTHESIOLOGY

## 2022-12-05 PROCEDURE — 258N000001 HC RX 258: Performed by: STUDENT IN AN ORGANIZED HEALTH CARE EDUCATION/TRAINING PROGRAM

## 2022-12-05 PROCEDURE — 250N000011 HC RX IP 250 OP 636: Performed by: NURSE ANESTHETIST, CERTIFIED REGISTERED

## 2022-12-05 PROCEDURE — 250N000011 HC RX IP 250 OP 636: Performed by: STUDENT IN AN ORGANIZED HEALTH CARE EDUCATION/TRAINING PROGRAM

## 2022-12-05 PROCEDURE — 999N000065 XR SHOULDER RIGHT PORT G/E 2 VIEWS: Mod: RT

## 2022-12-05 PROCEDURE — C1776 JOINT DEVICE (IMPLANTABLE): HCPCS | Performed by: STUDENT IN AN ORGANIZED HEALTH CARE EDUCATION/TRAINING PROGRAM

## 2022-12-05 PROCEDURE — 258N000003 HC RX IP 258 OP 636: Performed by: PHYSICIAN ASSISTANT

## 2022-12-05 PROCEDURE — 258N000003 HC RX IP 258 OP 636: Performed by: NURSE ANESTHETIST, CERTIFIED REGISTERED

## 2022-12-05 DEVICE — SCREW PERIPHERAL 22MM: Type: IMPLANTABLE DEVICE | Site: SHOULDER | Status: FUNCTIONAL

## 2022-12-05 DEVICE — IMPLANTABLE DEVICE
Type: IMPLANTABLE DEVICE | Site: SHOULDER | Status: FUNCTIONAL
Brand: AEQUALIS™ FLEX REVIVE™

## 2022-12-05 DEVICE — SCREW PERIPHERAL 26MM: Type: IMPLANTABLE DEVICE | Site: SHOULDER | Status: FUNCTIONAL

## 2022-12-05 DEVICE — IMPLANTABLE DEVICE
Type: IMPLANTABLE DEVICE | Site: SHOULDER | Status: FUNCTIONAL
Brand: TORNIER FLEX SHOULDER SYSTEM

## 2022-12-05 DEVICE — SCREW PERIPHERAL 18MM DWJ318: Type: IMPLANTABLE DEVICE | Site: SHOULDER | Status: FUNCTIONAL

## 2022-12-05 DEVICE — SCREW BSPLT 35MM 6.5MM AQLS PRFRM GLND RVRS CNTR NS DWJ135: Type: IMPLANTABLE DEVICE | Site: SHOULDER | Status: FUNCTIONAL

## 2022-12-05 DEVICE — IMPLANTABLE DEVICE
Type: IMPLANTABLE DEVICE | Site: SHOULDER | Status: FUNCTIONAL
Brand: TORNIER PERFORM® REVERSED GLENOID

## 2022-12-05 DEVICE — IMPLANTABLE DEVICE: Type: IMPLANTABLE DEVICE | Site: SHOULDER | Status: FUNCTIONAL

## 2022-12-05 RX ORDER — CEFAZOLIN SODIUM 2 G/100ML
2 INJECTION, SOLUTION INTRAVENOUS
Status: DISCONTINUED | OUTPATIENT
Start: 2022-12-05 | End: 2022-12-05 | Stop reason: HOSPADM

## 2022-12-05 RX ORDER — ESCITALOPRAM OXALATE 10 MG/1
10 TABLET ORAL AT BEDTIME
Status: DISCONTINUED | OUTPATIENT
Start: 2022-12-05 | End: 2022-12-06 | Stop reason: HOSPADM

## 2022-12-05 RX ORDER — NALOXONE HYDROCHLORIDE 0.4 MG/ML
0.4 INJECTION, SOLUTION INTRAMUSCULAR; INTRAVENOUS; SUBCUTANEOUS
Status: DISCONTINUED | OUTPATIENT
Start: 2022-12-05 | End: 2022-12-06 | Stop reason: HOSPADM

## 2022-12-05 RX ORDER — ONDANSETRON 4 MG/1
4 TABLET, ORALLY DISINTEGRATING ORAL EVERY 30 MIN PRN
Status: DISCONTINUED | OUTPATIENT
Start: 2022-12-05 | End: 2022-12-05 | Stop reason: HOSPADM

## 2022-12-05 RX ORDER — HYDROMORPHONE HCL IN WATER/PF 6 MG/30 ML
0.4 PATIENT CONTROLLED ANALGESIA SYRINGE INTRAVENOUS EVERY 5 MIN PRN
Status: DISCONTINUED | OUTPATIENT
Start: 2022-12-05 | End: 2022-12-05 | Stop reason: HOSPADM

## 2022-12-05 RX ORDER — DIPHENHYDRAMINE HYDROCHLORIDE 50 MG/ML
INJECTION INTRAMUSCULAR; INTRAVENOUS PRN
Status: DISCONTINUED | OUTPATIENT
Start: 2022-12-05 | End: 2022-12-05

## 2022-12-05 RX ORDER — OXYCODONE HYDROCHLORIDE 5 MG/1
5 TABLET ORAL EVERY 4 HOURS PRN
Status: DISCONTINUED | OUTPATIENT
Start: 2022-12-05 | End: 2022-12-05 | Stop reason: HOSPADM

## 2022-12-05 RX ORDER — ACETAMINOPHEN 325 MG/1
975 TABLET ORAL EVERY 8 HOURS
Status: DISCONTINUED | OUTPATIENT
Start: 2022-12-05 | End: 2022-12-06 | Stop reason: HOSPADM

## 2022-12-05 RX ORDER — VANCOMYCIN HYDROCHLORIDE 1 G/20ML
INJECTION, POWDER, LYOPHILIZED, FOR SOLUTION INTRAVENOUS PRN
Status: DISCONTINUED | OUTPATIENT
Start: 2022-12-05 | End: 2022-12-05 | Stop reason: HOSPADM

## 2022-12-05 RX ORDER — MULTIVITAMIN,THERAPEUTIC
1 TABLET ORAL DAILY
Status: DISCONTINUED | OUTPATIENT
Start: 2022-12-06 | End: 2022-12-06 | Stop reason: HOSPADM

## 2022-12-05 RX ORDER — HYDROMORPHONE HCL IN WATER/PF 6 MG/30 ML
0.2 PATIENT CONTROLLED ANALGESIA SYRINGE INTRAVENOUS
Status: DISCONTINUED | OUTPATIENT
Start: 2022-12-05 | End: 2022-12-06 | Stop reason: HOSPADM

## 2022-12-05 RX ORDER — CEFAZOLIN SODIUM 2 G/100ML
2 INJECTION, SOLUTION INTRAVENOUS EVERY 8 HOURS
Status: COMPLETED | OUTPATIENT
Start: 2022-12-05 | End: 2022-12-06

## 2022-12-05 RX ORDER — BACLOFEN 20 MG
500 TABLET ORAL AT BEDTIME
COMMUNITY

## 2022-12-05 RX ORDER — AMOXICILLIN 250 MG
1-2 CAPSULE ORAL 2 TIMES DAILY PRN
Qty: 30 TABLET | Refills: 0 | COMMUNITY
Start: 2022-12-05

## 2022-12-05 RX ORDER — CEFAZOLIN SODIUM 1 G/3ML
INJECTION, POWDER, FOR SOLUTION INTRAMUSCULAR; INTRAVENOUS PRN
Status: DISCONTINUED | OUTPATIENT
Start: 2022-12-05 | End: 2022-12-05

## 2022-12-05 RX ORDER — SODIUM CHLORIDE, SODIUM LACTATE, POTASSIUM CHLORIDE, CALCIUM CHLORIDE 600; 310; 30; 20 MG/100ML; MG/100ML; MG/100ML; MG/100ML
INJECTION, SOLUTION INTRAVENOUS CONTINUOUS
Status: DISCONTINUED | OUTPATIENT
Start: 2022-12-05 | End: 2022-12-05 | Stop reason: HOSPADM

## 2022-12-05 RX ORDER — DEXAMETHASONE SODIUM PHOSPHATE 4 MG/ML
INJECTION, SOLUTION INTRA-ARTICULAR; INTRALESIONAL; INTRAMUSCULAR; INTRAVENOUS; SOFT TISSUE PRN
Status: DISCONTINUED | OUTPATIENT
Start: 2022-12-05 | End: 2022-12-05

## 2022-12-05 RX ORDER — ACETAMINOPHEN 500 MG
500-1000 TABLET ORAL EVERY 6 HOURS PRN
COMMUNITY

## 2022-12-05 RX ORDER — OXYCODONE HYDROCHLORIDE 5 MG/1
5 TABLET ORAL EVERY 4 HOURS PRN
Status: DISCONTINUED | OUTPATIENT
Start: 2022-12-05 | End: 2022-12-06 | Stop reason: HOSPADM

## 2022-12-05 RX ORDER — NALOXONE HYDROCHLORIDE 0.4 MG/ML
0.2 INJECTION, SOLUTION INTRAMUSCULAR; INTRAVENOUS; SUBCUTANEOUS
Status: DISCONTINUED | OUTPATIENT
Start: 2022-12-05 | End: 2022-12-06 | Stop reason: HOSPADM

## 2022-12-05 RX ORDER — MAGNESIUM HYDROXIDE 1200 MG/15ML
LIQUID ORAL PRN
Status: DISCONTINUED | OUTPATIENT
Start: 2022-12-05 | End: 2022-12-05 | Stop reason: HOSPADM

## 2022-12-05 RX ORDER — MULTIVITAMIN,THERAPEUTIC
1 TABLET ORAL DAILY
COMMUNITY

## 2022-12-05 RX ORDER — LIDOCAINE 40 MG/G
CREAM TOPICAL
Status: DISCONTINUED | OUTPATIENT
Start: 2022-12-05 | End: 2022-12-05 | Stop reason: HOSPADM

## 2022-12-05 RX ORDER — SODIUM CHLORIDE, SODIUM LACTATE, POTASSIUM CHLORIDE, CALCIUM CHLORIDE 600; 310; 30; 20 MG/100ML; MG/100ML; MG/100ML; MG/100ML
INJECTION, SOLUTION INTRAVENOUS CONTINUOUS
Status: DISCONTINUED | OUTPATIENT
Start: 2022-12-05 | End: 2022-12-06 | Stop reason: HOSPADM

## 2022-12-05 RX ORDER — ONDANSETRON 2 MG/ML
INJECTION INTRAMUSCULAR; INTRAVENOUS PRN
Status: DISCONTINUED | OUTPATIENT
Start: 2022-12-05 | End: 2022-12-05

## 2022-12-05 RX ORDER — HYDROMORPHONE HCL IN WATER/PF 6 MG/30 ML
0.2 PATIENT CONTROLLED ANALGESIA SYRINGE INTRAVENOUS EVERY 5 MIN PRN
Status: DISCONTINUED | OUTPATIENT
Start: 2022-12-05 | End: 2022-12-05 | Stop reason: HOSPADM

## 2022-12-05 RX ORDER — HALOPERIDOL 5 MG/ML
1 INJECTION INTRAMUSCULAR
Status: DISCONTINUED | OUTPATIENT
Start: 2022-12-05 | End: 2022-12-05 | Stop reason: HOSPADM

## 2022-12-05 RX ORDER — ACETAMINOPHEN 325 MG/1
650 TABLET ORAL EVERY 4 HOURS PRN
Status: DISCONTINUED | OUTPATIENT
Start: 2022-12-08 | End: 2022-12-06 | Stop reason: HOSPADM

## 2022-12-05 RX ORDER — FENTANYL CITRATE 50 UG/ML
25 INJECTION, SOLUTION INTRAMUSCULAR; INTRAVENOUS EVERY 5 MIN PRN
Status: DISCONTINUED | OUTPATIENT
Start: 2022-12-05 | End: 2022-12-05 | Stop reason: HOSPADM

## 2022-12-05 RX ORDER — BISACODYL 10 MG
10 SUPPOSITORY, RECTAL RECTAL DAILY PRN
Status: DISCONTINUED | OUTPATIENT
Start: 2022-12-05 | End: 2022-12-06 | Stop reason: HOSPADM

## 2022-12-05 RX ORDER — ACETAMINOPHEN 325 MG/1
975 TABLET ORAL ONCE
Status: COMPLETED | OUTPATIENT
Start: 2022-12-05 | End: 2022-12-05

## 2022-12-05 RX ORDER — GABAPENTIN 100 MG/1
200 CAPSULE ORAL AT BEDTIME
Status: DISCONTINUED | OUTPATIENT
Start: 2022-12-05 | End: 2022-12-06 | Stop reason: HOSPADM

## 2022-12-05 RX ORDER — CEFAZOLIN SODIUM 2 G/100ML
2 INJECTION, SOLUTION INTRAVENOUS SEE ADMIN INSTRUCTIONS
Status: DISCONTINUED | OUTPATIENT
Start: 2022-12-05 | End: 2022-12-05 | Stop reason: HOSPADM

## 2022-12-05 RX ORDER — ONDANSETRON 4 MG/1
4 TABLET, ORALLY DISINTEGRATING ORAL EVERY 6 HOURS PRN
Status: DISCONTINUED | OUTPATIENT
Start: 2022-12-05 | End: 2022-12-06 | Stop reason: HOSPADM

## 2022-12-05 RX ORDER — MAGNESIUM OXIDE 400 MG/1
400 TABLET ORAL AT BEDTIME
Status: DISCONTINUED | OUTPATIENT
Start: 2022-12-05 | End: 2022-12-06 | Stop reason: HOSPADM

## 2022-12-05 RX ORDER — ASPIRIN 81 MG/1
81 TABLET ORAL 2 TIMES DAILY
Status: DISCONTINUED | OUTPATIENT
Start: 2022-12-05 | End: 2022-12-06 | Stop reason: HOSPADM

## 2022-12-05 RX ORDER — PROCHLORPERAZINE MALEATE 5 MG
5 TABLET ORAL EVERY 6 HOURS PRN
Status: DISCONTINUED | OUTPATIENT
Start: 2022-12-05 | End: 2022-12-06 | Stop reason: HOSPADM

## 2022-12-05 RX ORDER — FENTANYL CITRATE 50 UG/ML
50 INJECTION, SOLUTION INTRAMUSCULAR; INTRAVENOUS EVERY 5 MIN PRN
Status: DISCONTINUED | OUTPATIENT
Start: 2022-12-05 | End: 2022-12-05 | Stop reason: HOSPADM

## 2022-12-05 RX ORDER — ASPIRIN 81 MG/1
81 TABLET ORAL 2 TIMES DAILY
Qty: 60 TABLET | Refills: 0 | COMMUNITY
Start: 2022-12-05 | End: 2022-12-06

## 2022-12-05 RX ORDER — PROPOFOL 10 MG/ML
INJECTION, EMULSION INTRAVENOUS PRN
Status: DISCONTINUED | OUTPATIENT
Start: 2022-12-05 | End: 2022-12-05

## 2022-12-05 RX ORDER — ONDANSETRON 2 MG/ML
4 INJECTION INTRAMUSCULAR; INTRAVENOUS EVERY 30 MIN PRN
Status: DISCONTINUED | OUTPATIENT
Start: 2022-12-05 | End: 2022-12-05 | Stop reason: HOSPADM

## 2022-12-05 RX ORDER — BUPIVACAINE HYDROCHLORIDE AND EPINEPHRINE 5; 5 MG/ML; UG/ML
INJECTION, SOLUTION PERINEURAL
Status: COMPLETED | OUTPATIENT
Start: 2022-12-05 | End: 2022-12-05

## 2022-12-05 RX ORDER — AMOXICILLIN 250 MG
1 CAPSULE ORAL 2 TIMES DAILY
Status: DISCONTINUED | OUTPATIENT
Start: 2022-12-05 | End: 2022-12-06 | Stop reason: HOSPADM

## 2022-12-05 RX ORDER — PROPOFOL 10 MG/ML
INJECTION, EMULSION INTRAVENOUS CONTINUOUS PRN
Status: DISCONTINUED | OUTPATIENT
Start: 2022-12-05 | End: 2022-12-05

## 2022-12-05 RX ORDER — HYDROMORPHONE HCL IN WATER/PF 6 MG/30 ML
0.4 PATIENT CONTROLLED ANALGESIA SYRINGE INTRAVENOUS
Status: DISCONTINUED | OUTPATIENT
Start: 2022-12-05 | End: 2022-12-06 | Stop reason: HOSPADM

## 2022-12-05 RX ORDER — LIDOCAINE 40 MG/G
CREAM TOPICAL
Status: DISCONTINUED | OUTPATIENT
Start: 2022-12-05 | End: 2022-12-06 | Stop reason: HOSPADM

## 2022-12-05 RX ORDER — METHOCARBAMOL 500 MG/1
500 TABLET, FILM COATED ORAL EVERY 6 HOURS PRN
Status: DISCONTINUED | OUTPATIENT
Start: 2022-12-05 | End: 2022-12-06 | Stop reason: HOSPADM

## 2022-12-05 RX ORDER — ACETAMINOPHEN 325 MG/1
650 TABLET ORAL EVERY 4 HOURS PRN
Qty: 100 TABLET | Refills: 0 | COMMUNITY
Start: 2022-12-05

## 2022-12-05 RX ORDER — ONDANSETRON 2 MG/ML
4 INJECTION INTRAMUSCULAR; INTRAVENOUS EVERY 6 HOURS PRN
Status: DISCONTINUED | OUTPATIENT
Start: 2022-12-05 | End: 2022-12-06 | Stop reason: HOSPADM

## 2022-12-05 RX ORDER — ERGOCALCIFEROL (VITAMIN D2) 10 MCG
800 TABLET ORAL DAILY
COMMUNITY

## 2022-12-05 RX ORDER — SCOLOPAMINE TRANSDERMAL SYSTEM 1 MG/1
1 PATCH, EXTENDED RELEASE TRANSDERMAL ONCE
Status: COMPLETED | OUTPATIENT
Start: 2022-12-05 | End: 2022-12-06

## 2022-12-05 RX ORDER — MEPERIDINE HYDROCHLORIDE 50 MG/ML
12.5 INJECTION INTRAMUSCULAR; INTRAVENOUS; SUBCUTANEOUS EVERY 5 MIN PRN
Status: DISCONTINUED | OUTPATIENT
Start: 2022-12-05 | End: 2022-12-05 | Stop reason: HOSPADM

## 2022-12-05 RX ORDER — FENTANYL CITRATE 50 UG/ML
25-100 INJECTION, SOLUTION INTRAMUSCULAR; INTRAVENOUS
Status: DISCONTINUED | OUTPATIENT
Start: 2022-12-05 | End: 2022-12-05 | Stop reason: HOSPADM

## 2022-12-05 RX ORDER — POLYETHYLENE GLYCOL 3350 17 G/17G
17 POWDER, FOR SOLUTION ORAL DAILY
Status: DISCONTINUED | OUTPATIENT
Start: 2022-12-06 | End: 2022-12-06 | Stop reason: HOSPADM

## 2022-12-05 RX ORDER — OXYCODONE HYDROCHLORIDE 5 MG/1
10 TABLET ORAL EVERY 4 HOURS PRN
Status: DISCONTINUED | OUTPATIENT
Start: 2022-12-05 | End: 2022-12-06 | Stop reason: HOSPADM

## 2022-12-05 RX ADMIN — SODIUM CHLORIDE, POTASSIUM CHLORIDE, SODIUM LACTATE AND CALCIUM CHLORIDE: 600; 310; 30; 20 INJECTION, SOLUTION INTRAVENOUS at 07:20

## 2022-12-05 RX ADMIN — LIDOCAINE HYDROCHLORIDE 30 MG: 10 INJECTION, SOLUTION INFILTRATION; PERINEURAL at 08:10

## 2022-12-05 RX ADMIN — PHENYLEPHRINE HYDROCHLORIDE 100 MCG: 10 INJECTION INTRAVENOUS at 08:42

## 2022-12-05 RX ADMIN — PROPOFOL 200 MG: 10 INJECTION, EMULSION INTRAVENOUS at 08:10

## 2022-12-05 RX ADMIN — SODIUM CHLORIDE, POTASSIUM CHLORIDE, SODIUM LACTATE AND CALCIUM CHLORIDE: 600; 310; 30; 20 INJECTION, SOLUTION INTRAVENOUS at 09:50

## 2022-12-05 RX ADMIN — FENTANYL CITRATE 100 MCG: 50 INJECTION INTRAMUSCULAR; INTRAVENOUS at 07:14

## 2022-12-05 RX ADMIN — ASPIRIN 81 MG: 81 TABLET, COATED ORAL at 21:29

## 2022-12-05 RX ADMIN — SUGAMMADEX 100 MG: 100 INJECTION, SOLUTION INTRAVENOUS at 11:27

## 2022-12-05 RX ADMIN — ROCURONIUM BROMIDE 20 MG: 10 INJECTION, SOLUTION INTRAVENOUS at 09:16

## 2022-12-05 RX ADMIN — BUPIVACAINE HYDROCHLORIDE AND EPINEPHRINE 5 ML: 5; 5 INJECTION, SOLUTION PERINEURAL at 07:20

## 2022-12-05 RX ADMIN — ESCITALOPRAM OXALATE 10 MG: 10 TABLET ORAL at 21:29

## 2022-12-05 RX ADMIN — PHENYLEPHRINE HYDROCHLORIDE 100 MCG: 10 INJECTION INTRAVENOUS at 09:20

## 2022-12-05 RX ADMIN — PHENYLEPHRINE HYDROCHLORIDE 100 MCG: 10 INJECTION INTRAVENOUS at 10:04

## 2022-12-05 RX ADMIN — ROCURONIUM BROMIDE 50 MG: 10 INJECTION, SOLUTION INTRAVENOUS at 08:10

## 2022-12-05 RX ADMIN — MIDAZOLAM HYDROCHLORIDE 2 MG: 1 INJECTION, SOLUTION INTRAMUSCULAR; INTRAVENOUS at 07:14

## 2022-12-05 RX ADMIN — PHENYLEPHRINE HYDROCHLORIDE 100 MCG: 10 INJECTION INTRAVENOUS at 09:46

## 2022-12-05 RX ADMIN — ACETAMINOPHEN 975 MG: 325 TABLET ORAL at 07:08

## 2022-12-05 RX ADMIN — PHENYLEPHRINE HYDROCHLORIDE 0.4 MCG/KG/MIN: 10 INJECTION INTRAVENOUS at 10:10

## 2022-12-05 RX ADMIN — BUPIVACAINE HYDROCHLORIDE AND EPINEPHRINE BITARTRATE 10 ML: 5; .005 INJECTION, SOLUTION PERINEURAL at 07:15

## 2022-12-05 RX ADMIN — ONDANSETRON 4 MG: 2 INJECTION INTRAMUSCULAR; INTRAVENOUS at 10:12

## 2022-12-05 RX ADMIN — CEFAZOLIN 2 G: 1 INJECTION, POWDER, FOR SOLUTION INTRAMUSCULAR; INTRAVENOUS at 08:15

## 2022-12-05 RX ADMIN — PROPOFOL 50 MCG/KG/MIN: 10 INJECTION, EMULSION INTRAVENOUS at 08:32

## 2022-12-05 RX ADMIN — PHENYLEPHRINE HYDROCHLORIDE 100 MCG: 10 INJECTION INTRAVENOUS at 09:05

## 2022-12-05 RX ADMIN — SODIUM CHLORIDE, POTASSIUM CHLORIDE, SODIUM LACTATE AND CALCIUM CHLORIDE: 600; 310; 30; 20 INJECTION, SOLUTION INTRAVENOUS at 07:00

## 2022-12-05 RX ADMIN — FENTANYL CITRATE 50 MCG: 50 INJECTION INTRAMUSCULAR; INTRAVENOUS at 08:10

## 2022-12-05 RX ADMIN — PHENYLEPHRINE HYDROCHLORIDE 100 MCG: 10 INJECTION INTRAVENOUS at 09:10

## 2022-12-05 RX ADMIN — GABAPENTIN 200 MG: 100 CAPSULE ORAL at 21:29

## 2022-12-05 RX ADMIN — MAGNESIUM OXIDE TAB 400 MG (241.3 MG ELEMENTAL MG) 400 MG: 400 (241.3 MG) TAB at 21:29

## 2022-12-05 RX ADMIN — SCOPALAMINE 1 PATCH: 1 PATCH, EXTENDED RELEASE TRANSDERMAL at 07:33

## 2022-12-05 RX ADMIN — DEXAMETHASONE SODIUM PHOSPHATE 10 MG: 4 INJECTION, SOLUTION INTRA-ARTICULAR; INTRALESIONAL; INTRAMUSCULAR; INTRAVENOUS; SOFT TISSUE at 08:10

## 2022-12-05 RX ADMIN — PHENYLEPHRINE HYDROCHLORIDE 100 MCG: 10 INJECTION INTRAVENOUS at 08:38

## 2022-12-05 RX ADMIN — SUGAMMADEX 100 MG: 100 INJECTION, SOLUTION INTRAVENOUS at 11:20

## 2022-12-05 RX ADMIN — SENNOSIDES AND DOCUSATE SODIUM 1 TABLET: 50; 8.6 TABLET ORAL at 21:29

## 2022-12-05 RX ADMIN — CEFAZOLIN SODIUM 2 G: 2 INJECTION, SOLUTION INTRAVENOUS at 16:09

## 2022-12-05 RX ADMIN — DIPHENHYDRAMINE HYDROCHLORIDE 25 MG: 50 INJECTION, SOLUTION INTRAMUSCULAR; INTRAVENOUS at 08:48

## 2022-12-05 RX ADMIN — FENTANYL CITRATE 50 MCG: 50 INJECTION INTRAMUSCULAR; INTRAVENOUS at 10:29

## 2022-12-05 RX ADMIN — BUPIVACAINE 10 ML: 13.3 INJECTION, SUSPENSION, LIPOSOMAL INFILTRATION at 07:15

## 2022-12-05 RX ADMIN — PHENYLEPHRINE HYDROCHLORIDE 100 MCG: 10 INJECTION INTRAVENOUS at 09:01

## 2022-12-05 RX ADMIN — ACETAMINOPHEN 975 MG: 325 TABLET, FILM COATED ORAL at 16:08

## 2022-12-05 RX ADMIN — PHENYLEPHRINE HYDROCHLORIDE 100 MCG: 10 INJECTION INTRAVENOUS at 09:22

## 2022-12-05 RX ADMIN — SODIUM CHLORIDE, POTASSIUM CHLORIDE, SODIUM LACTATE AND CALCIUM CHLORIDE: 600; 310; 30; 20 INJECTION, SOLUTION INTRAVENOUS at 13:52

## 2022-12-05 ASSESSMENT — ACTIVITIES OF DAILY LIVING (ADL)
ADLS_ACUITY_SCORE: 24
ADLS_ACUITY_SCORE: 22
ADLS_ACUITY_SCORE: 35
ADLS_ACUITY_SCORE: 24

## 2022-12-05 NOTE — ANESTHESIA PREPROCEDURE EVALUATION
Anesthesia Pre-Procedure Evaluation    Patient: Rachana Quinn   MRN: [id number] : 1955        Procedure : Procedure(s):  RIGHT REVERSE TOTAL SHOULDER ARTHROPLASTY          Past Medical History:   Diagnosis Date     PONV (postoperative nausea and vomiting)      Post-menopause on HRT (hormone replacement therapy)       Past Surgical History:   Procedure Laterality Date     GALLBLADDER SURGERY       HC REMOVAL GALLBLADDER      Description: Cholecystectomy;  Recorded: 2008;     HYSTERECTOMY, PAP NO LONGER INDICATED      menorrhagia-still has ovaries      ZZC TOTAL ABDOM HYSTERECTOMY      Description: Hysterectomy;  Recorded: 2008;      Allergies   Allergen Reactions     Codeine Sulfate Itching     Effexor [Venlafaxine] Nausea     Disorientation      Morphine Sulfate Other (See Comments)     Hallucination      Penicillins Difficulty breathing     Throat swelling      Sulfa Drugs Itching      Social History     Tobacco Use     Smoking status: Never     Smokeless tobacco: Never   Substance Use Topics     Alcohol use: Yes     Comment: occ      Wt Readings from Last 1 Encounters:   22 110.2 kg (243 lb)        Anesthesia Evaluation   Pt has had prior anesthetic.     History of anesthetic complications  - PONV.      ROS/MED HX  ENT/Pulmonary:  - neg pulmonary ROS     Neurologic: Comment: RLS      Cardiovascular:  - neg cardiovascular ROS     METS/Exercise Tolerance:     Hematologic:  - neg hematologic  ROS     Musculoskeletal:  - neg musculoskeletal ROS     GI/Hepatic:    (-) GERD   Renal/Genitourinary:  - neg Renal ROS     Endo:  - neg endo ROS     Psychiatric/Substance Use:     (+) psychiatric history anxiety     Infectious Disease:  - neg infectious disease ROS     Malignancy:  - neg malignancy ROS     Other:  - neg other ROS          Physical Exam    Airway  airway exam normal      Mallampati: II   TM distance: > 3 FB   Neck ROM: full   Mouth opening: > 3 cm    Respiratory Devices and  Support         Dental  no notable dental history         Cardiovascular   cardiovascular exam normal       Rhythm and rate: regular and normal     Pulmonary   pulmonary exam normal        breath sounds clear to auscultation           OUTSIDE LABS:  CBC:   Lab Results   Component Value Date    WBC 6.9 05/07/2018    HGB 13.4 05/07/2018    HCT 40.5 05/07/2018     05/07/2018     BMP:   Lab Results   Component Value Date     05/07/2018    POTASSIUM 4.2 05/07/2018    CHLORIDE 102 05/07/2018    CO2 24 05/07/2018    BUN 11 05/07/2018    CR 0.72 05/07/2018    GLC 82 05/07/2018    GLC 84 02/20/2015     COAGS: No results found for: PTT, INR, FIBR  POC: No results found for: BGM, HCG, HCGS  HEPATIC: No results found for: ALBUMIN, PROTTOTAL, ALT, AST, GGT, ALKPHOS, BILITOTAL, BILIDIRECT, ARTUR  OTHER:   Lab Results   Component Value Date    AMIRAH 9.4 05/07/2018    TSH 2.22 05/07/2018       Anesthesia Plan    ASA Status:  2   NPO Status:  NPO Appropriate    Anesthesia Type: General.     - Airway: ETT   Induction: Intravenous, Propofol.   Maintenance: Balanced.        Consents    Anesthesia Plan(s) and associated risks, benefits, and realistic alternatives discussed. Questions answered and patient/representative(s) expressed understanding.    - Discussed:     - Discussed with:  Patient         Postoperative Care    Pain management: IV analgesics, Oral pain medications, Multi-modal analgesia, Peripheral nerve block (Single Shot).   PONV prophylaxis: Ondansetron (or other 5HT-3), Dexamethasone or Solumedrol, Droperidol or Haldol, Background Propofol Infusion     Comments:                CACHORRO CLIFTON MD

## 2022-12-05 NOTE — H&P
HPI: 67-year-old female sustained a fall with a displaced comminuted right proximal humerus fracture.  Severe pain and swelling in the right shoulder.  Denies numbness or tingling.  She is right-hand dominant.    Past medical history:  Notable for restless leg syndrome, depression, no significant pulmonary issues.    Objective:  Well-appearing female no distress.  Right upper extremity is examined.  Ecchymosis and intact skin.  She did have a preblock prior to my examination.  She can weakly fire all of her nerves however, including axillary, radial, median, ulnar.  Sensation is globally decreased due to block.  2+ DP pulse.  Hand warm and well-perfused.    X-rays and CT scan of the right shoulder were personally reviewed by me.  These demonstrate a comminuted and severely displaced right proximal humerus fracture with displaced lesser tuberosity fracture and extension of fracture line into the humeral head.    Assessment: 67-year-old female with comminuted displaced right proximal humerus fracture    Plan: I discussed over the phone with her the treatment options.  Given the CT findings, she elected to proceed with right reverse shoulder arthroplasty.  I reviewed the risks of surgery including bleeding, infection, nerve damage, DVT or PE, anesthesia complications, acromial stress fracture, fracture propagation distally, dislocation, implant loosening, need for revision surgery, and others.  No guarantees were made regarding specific outcome.  Informed consent was obtained.    LEIDY REMY MD

## 2022-12-05 NOTE — CONSULTS
[facility] MEDICINE CONSULT NOTE   Physician requesting consult: Raudel Rajan MD    Reason for consult: Postoperative medical management of medical co-morbidities as below    Identification/Summary:   Rachana Quinn is a 67 year old female with a PMH of RLS, anxiety depression, postmenopausal status and PONV.  Underwent right reverse total shoulder arthroplasty.     Assessment and Plan:  -Anxiety depression  Order home Lexapro.  -Restless leg syndrome  Order bedtime gabapentin.  -History of postoperative nausea vomiting  Symptoms under good control at this time.    COVID-19 PCR negative preoperative test  Noted.  Standard precautions.  Anticoagulation.    Aspirin 81 mg twice daily ordered by surgery.  Routine postoperative risk.  Fluids: Per orthopedics  Pain meds: Per orthopedics  Therapy: Per orthopedics  Rios:Not present  Current Diet  Orders Placed This Encounter      Advance Diet as Tolerated: Regular Diet Adult      Discharge Instruction - Regular Diet Adult    Supplements  None      -Disposition   per orthopedics    Code status:No Order   HMS service was asked to evaluate patient for postoperative medical management as follows below. Please resume the home medications as reconciled and further noted with ordered hold parameters.  Thank you for this consult; we will continue to follow this patient until discharge.    Procedure(s):  RIGHT REVERSE TOTAL SHOULDER ARTHROPLASTY  Day of Surgery  Estimated Blood Loss:  250 mL  Hospital Problem List   No problem-specific Assessment & Plan notes found for this encounter.    Active Problems:    Anxiety and depression    Restless leg syndrome      -Reviewed the patient's preoperative H and P and updated missing elements.  -Home medication reconciliation has been reviewed.  Medications have been ordered as noted from the home list and changes are documented above     Clinically Significant Risk Factors Present on Admission                    "   # Obesity: Estimated body mass index is 39.22 kg/m  as calculated from the following:    Height as of this encounter: 1.676 m (5' 6\").    Weight as of this encounter: 110.2 kg (243 lb).           HISTORY     Rachana Quinn is a 67 year old female with PMH of RLS, anxiety depression, postmenopausal status and PONV.  Underwent right reverse total shoulder arthroplasty.  Doing well postoperatively.  No nausea or vomiting.  No shortness of breath.  No chest pain.  Denies personal history of heart attack, stroke, cancer, diabetes, DVT, PE, peptic ulcer disease or sleep apnea.  Hoping to discharge home tomorrow..  .  Questions answered to verbalized satisfaction.    Past Medical History     Past Medical History:  No date: Anxiety and depression  No date: PONV (postoperative nausea and vomiting)  No date: Post-menopause on HRT (hormone replacement therapy)  No date: Restless leg syndrome     Patient Active Problem List    Diagnosis Date Noted     Anxiety and depression 12/05/2022     Priority: Medium     Restless leg syndrome 12/05/2022     Priority: Medium     Encounter for gynecological examination without abnormal finding 08/24/2016     Priority: Medium     Vulvitis 10/10/2012     Priority: Medium     mycolog       Increased BMI 10/10/2012     Priority: Medium     Phentermine, diet, exercise       Menopausal syndrome (hot flashes) 10/10/2012     Priority: Medium     Premarin decreased and offered to decrease or stop-will consider next year       Surgical History     Past Surgical History:   Procedure Laterality Date     GALLBLADDER SURGERY  1988     HC REMOVAL GALLBLADDER      Description: Cholecystectomy;  Recorded: 07/28/2008;     HYSTERECTOMY, PAP NO LONGER INDICATED  1989    menorrhagia-still has ovaries      ZZC TOTAL ABDOM HYSTERECTOMY      Description: Hysterectomy;  Recorded: 07/28/2008;     Family History      Family History   Problem Relation Age of Onset     Diabetes Mother      Hypertension Mother      " Neurologic Disorder Mother         MS     Thyroid Disease Mother      Hypertension Father      C.A.D. Father      Other Cancer Daughter         cervical ca     Cerebrovascular Disease No family hx of      Breast Cancer No family hx of      Cancer - colorectal No family hx of      Prostate Cancer No family hx of      Coronary Artery Disease Father          age 63     Multiple Sclerosis Mother          age 69     Depression Son         suicide age 16     Lupus Daughter          age 38 of vasculitis and complicated problems      Social History      Social History     Tobacco Use     Smoking status: Never     Smokeless tobacco: Never   Substance Use Topics     Alcohol use: Yes     Comment: occ     Drug use: No      Allergies     Allergies   Allergen Reactions     Codeine Sulfate Itching     Effexor [Venlafaxine] Nausea     Disorientation      Morphine Sulfate Other (See Comments)     Hallucination      Penicillins Difficulty breathing     Throat swelling      Sulfa Drugs Itching       Prior to Admission Medications      Prior to Admission Medications   Prescriptions Last Dose Informant Patient Reported? Taking?   Magnesium Oxide 500 MG TABS 2022  Yes Yes   Sig: Take 500 mg by mouth At Bedtime   Multiple Vitamins-Minerals (HAIR SKIN AND NAILS FORMULA) TABS 2022  Yes Yes   Sig: Take 1 tablet by mouth daily   Vitamin D, Cholecalciferol, 10 MCG (400 UNIT) TABS 2022  Yes Yes   Sig: Take 800 Units by mouth daily   acetaminophen (TYLENOL) 500 MG tablet 2022  Yes Yes   Sig: Take 500-1,000 mg by mouth every 6 hours as needed for mild pain   escitalopram (LEXAPRO) 20 MG tablet 2022  Yes Yes   Sig: Take 10 mg by mouth At Bedtime   gabapentin (NEURONTIN) 100 MG capsule 2022  Yes Yes   Sig: Take 200 mg by mouth At Bedtime   multivitamin, therapeutic (THERA-VIT) TABS tablet 2022  Yes Yes   Sig: Take 1 tablet by mouth daily      Facility-Administered Medications: None      Review of  Systems     A 12 point comprehensive review of systems was negative except as noted above in HPI.    OBJECTIVE         Physical Exam   Temp:  [98.2  F (36.8  C)-99  F (37.2  C)] 98.2  F (36.8  C)  Pulse:  [67-80] 71  Resp:  [14-40] 23  BP: (131-177)/() 139/66  SpO2:  [92 %-100 %] 95 %  Body mass index is 39.22 kg/m .  Constitutional: awake, alert, cooperative, no apparent distress, and appears stated age and moderately obese  Eyes: Lids and lashes normal, pupils equal, round and reactive to light, extra ocular muscles intact, sclera clear, conjunctiva normal  ENT: Normocephalic, without obvious abnormality, atraumatic, sinuses nontender on palpation, external ears without lesions, oral pharynx with moist mucous membranes, tonsils without erythema or exudates, gums normal and good dentition.  Hematologic / Lymphatic: no cervical lymphadenopathy and no supraclavicular lymphadenopathy  Respiratory: No increased work of breathing, good air exchange, clear to auscultation bilaterally, no crackles or wheezing  Cardiovascular: Normal apical impulse, regular rate and rhythm, normal S1 and S2, no S3 or S4, and no murmur noted  GI: No scars, normal bowel sounds, soft, non-distended, non-tender, no masses palpated, no hepatosplenomegally  Skin: normal skin color, texture, turgor, no redness, warmth, or swelling, and no rashes  Musculoskeletal: Right arm is in a sling.  Otherwise unremarkable exam.  Has some tingling at the fingers bilaterally but good function and  strength bilaterally.  no lower extremity pitting edema present  Neurologic: Cranial nerves II-XII are grossly intact. Sensory:  Sensory intact  Tingling at the right hand secondary to nerve block  Neuropsychiatric: General: normal, calm and normal eye contact Level of consciousness: alert / normal Affect: normal Orientation: oriented to self, place, time and situation Memory and insight: normal, memory for past and recent events intact and thought process  "normal        Imaging Reviewed Personally By Myself      Radiology Results:   Recent Results (from the past 24 hour(s))   POC US Guidance Needle Placement    Narrative    Ultrasound was performed as guidance to an anesthesia procedure.  Click   \"PACS images\" hyperlink below to view any stored images.  For specific   procedure details, view procedure note authored by anesthesia.   POC US Guidance Needle Placement    Narrative    Ultrasound was performed as guidance to an anesthesia procedure.  Click   \"PACS images\" hyperlink below to view any stored images.  For specific   procedure details, view procedure note authored by anesthesia.   XR Shoulder Right Port G/E 2 Views    Narrative    EXAM: XR SHOULDER RIGHT PORT G/E 2 VIEWS  LOCATION: [facility]  DATE/TIME: 12/5/2022 12:19 PM    INDICATION: Status post surgery  COMPARISON: None available.      Impression    IMPRESSION: Postoperative changes reverse glenohumeral joint replacement with longstem humeral component. There is a fracture through the proximal humerus at the junction with the greater tuberosity, mildly displaced. This is best seen along the lateral   cortex. No dislocation.       Labs Reviewed Personally By Myself     Results for orders placed or performed during the hospital encounter of 12/05/22 (from the past 24 hour(s))   POC US Guidance Needle Placement    Narrative    Ultrasound was performed as guidance to an anesthesia procedure.  Click   \"PACS images\" hyperlink below to view any stored images.  For specific   procedure details, view procedure note authored by anesthesia.   POC US Guidance Needle Placement    Narrative    Ultrasound was performed as guidance to an anesthesia procedure.  Click   \"PACS images\" hyperlink below to view any stored images.  For specific   procedure details, view procedure note authored by anesthesia.   XR Shoulder Right Port G/E 2 Views    Narrative    EXAM: XR SHOULDER RIGHT PORT G/E 2 " VIEWS  LOCATION: [facility]  DATE/TIME: 12/5/2022 12:19 PM    INDICATION: Status post surgery  COMPARISON: None available.      Impression    IMPRESSION: Postoperative changes reverse glenohumeral joint replacement with longstem humeral component. There is a fracture through the proximal humerus at the junction with the greater tuberosity, mildly displaced. This is best seen along the lateral   cortex. No dislocation.       Preoperative Labs Reviewed Personally By Myself     COVID-19 PCR negative from 11/28/2022 9/27/2022 WBC 7.6, hemoglobin 12.5, platelets 265      Thank you for this consultation.  Appreciate the opportunity to participate in the care of Rachana Quinn, please feel free to contact us for any questions or concerns.    Brennan Zuniga MD  Hospitalist  Hospital Medicine  [facility]  Phone: #[phone]

## 2022-12-05 NOTE — ANESTHESIA PROCEDURE NOTES
Brachial plexus Procedure Note    Pre-Procedure   Staff -        Anesthesiologist:  Lance Thomas MD       Performed By: anesthesiologist       Location: pre-op       Procedure Start/Stop Times: 12/5/2022 7:15 AM and 12/5/2022 7:19 AM       Pre-Anesthestic Checklist: patient identified, IV checked, site marked, risks and benefits discussed, informed consent, monitors and equipment checked, pre-op evaluation, at physician/surgeon's request and post-op pain management  Timeout:       Correct Patient: Yes        Correct Procedure: Yes        Correct Site: Yes        Correct Position: Yes        Correct Laterality: Yes        Site Marked: Yes  Procedure Documentation  Procedure: Brachial plexus       Laterality: right       Patient Position: supine       Patient Prep/Sterile Barriers: sterile gloves, mask       Skin prep: Chloraprep (interscalene approach).       Needle Type: insulated       Needle Gauge: 20.        Needle Length (Inches): 4        Ultrasound guided       1. Ultrasound was used to identify targeted nerve, plexus, vascular marker, or fascial plane and place a needle adjacent to it in real-time.       2. Ultrasound was used to visualize the spread of anesthetic in close proximity to the above referenced structure.       3. A permanent image is entered into the patient's record.       4. The visualized anatomic structures appeared normal.       5. There were no apparent abnormal pathologic findings.    Assessment/Narrative         The placement was negative for: blood aspirated, painful injection and site bleeding       Paresthesias: No.       Bolus given via needle..        Secured via.        Insertion/Infusion Method: Single Shot       Complications: none       Injection made incrementally with aspirations every 5 mL.    Medication(s) Administered   Bupivacaine 0.5% w/ 1:200K Epi (Other) - Other   10 mL - 12/5/2022 7:15:00 AM  Bupivacaine liposome (Exparel) 1.3% LA inj susp (Infiltration) -  "Infiltration   10 mL - 12/5/2022 7:15:00 AM  Medication Administration Time: 12/5/2022 7:15 AM      FOR [facility] ONLY:   Pain Team Contact information: please page the Pain Team Via [employer]. Search \"Pain\". During daytime hours, please page the attending first. At night please page the resident first.    "

## 2022-12-05 NOTE — ANESTHESIA POSTPROCEDURE EVALUATION
Patient: Rachana Quinn    Procedure: Procedure(s):  RIGHT REVERSE TOTAL SHOULDER ARTHROPLASTY       Anesthesia Type:  General    Note:  Disposition: Admission   Postop Pain Control: Uneventful            Sign Out: Well controlled pain   PONV: No   Neuro/Psych: Uneventful            Sign Out: Acceptable/Baseline neuro status   Airway/Respiratory: Uneventful            Sign Out: Acceptable/Baseline resp. status   CV/Hemodynamics: Uneventful            Sign Out: Acceptable CV status; No obvious hypovolemia; No obvious fluid overload   Other NRE: NONE   DID A NON-ROUTINE EVENT OCCUR? No           Last vitals:  Vitals Value Taken Time   /69 12/05/22 1300   Temp 36.8  C (98.2  F) 12/05/22 1300   Pulse 70 12/05/22 1303   Resp 17 12/05/22 1215   SpO2 95 % 12/05/22 1304   Vitals shown include unvalidated device data.    Electronically Signed By: CACHORRO CLIFTON MD  December 5, 2022  1:44 PM

## 2022-12-05 NOTE — PHARMACY-ADMISSION MEDICATION HISTORY
Pharmacy Note - Admission Medication History    Pertinent Provider Information: n/a   ______________________________________________________________________    Prior To Admission (PTA) med list completed and updated in EMR.       PTA Med List   Medication Sig Note Last Dose     acetaminophen (TYLENOL) 500 MG tablet Take 500-1,000 mg by mouth every 6 hours as needed for mild pain  12/4/2022     escitalopram (LEXAPRO) 20 MG tablet Take 10 mg by mouth At Bedtime 12/5/2022: 1/2 tab x 20 mg  12/4/2022     gabapentin (NEURONTIN) 100 MG capsule Take 200 mg by mouth At Bedtime  12/4/2022     Magnesium Oxide 500 MG TABS Take 500 mg by mouth At Bedtime  12/4/2022     Multiple Vitamins-Minerals (HAIR SKIN AND NAILS FORMULA) TABS Take 1 tablet by mouth daily  12/4/2022     multivitamin, therapeutic (THERA-VIT) TABS tablet Take 1 tablet by mouth daily  12/4/2022     Vitamin D, Cholecalciferol, 10 MCG (400 UNIT) TABS Take 800 Units by mouth daily  12/4/2022       Information source(s): Patient and CareEverywhere/[facility]    Method of interview communication: in-person    Patient was asked about OTC/herbal products specifically.  PTA med list reflects this.    Based on the pharmacist's assessment, the PTA med list information appears reliable    Allergies were reviewed, assessed, and updated with the patient.      Patient does not use any multi-dose medications prior to admission.     Thank you for the opportunity to participate in the care of this patient.      Mi Danielle [facility]     12/5/2022     7:00 AM

## 2022-12-05 NOTE — ANESTHESIA CARE TRANSFER NOTE
Patient: Rachana Quinn    Procedure: Procedure(s):  RIGHT REVERSE TOTAL SHOULDER ARTHROPLASTY       Diagnosis: Shoulder fracture, right [S42.91XA]  Diagnosis Additional Information: No value filed.    Anesthesia Type:   General     Note:    Oropharynx: oropharynx clear of all foreign objects  Level of Consciousness: awake and drowsy  Oxygen Supplementation: face mask  Level of Supplemental Oxygen (L/min / FiO2): 6  Independent Airway: airway patency satisfactory and stable  Dentition: dentition unchanged  Vital Signs Stable: post-procedure vital signs reviewed and stable  Report to RN Given: handoff report given  Patient transferred to: PACU    Handoff Report: Identifed the Patient, Identified the Reponsible Provider, Reviewed the pertinent medical history, Discussed the surgical course, Reviewed Intra-OP anesthesia mangement and issues during anesthesia, Set expectations for post-procedure period and Allowed opportunity for questions and acknowledgement of understanding      Vitals:  Vitals Value Taken Time   /62 12/05/22 1144   Temp 37.2  C (99  F) 12/05/22 1144   Pulse 71 12/05/22 1146   Resp 15 12/05/22 1146   SpO2 97 % 12/05/22 1146   Vitals shown include unvalidated device data.    Electronically Signed By: PRUDENCE Harvey CRNA  December 5, 2022  11:48 AM

## 2022-12-05 NOTE — ANESTHESIA PROCEDURE NOTES
Airway         Procedure Start/Stop Times: 12/5/2022 8:13 AM  Staff -        CRNA: Fuad Sharma APRN CRNA       Performed By: CRNA  Consent for Airway        Urgency: elective  Indications and Patient Condition       Indications for airway management: ryann-procedural       Induction type:intravenous       Mask difficulty assessment: 2 - vent by mask + OA or adjuvant +/- NMBA    Final Airway Details       Final airway type: endotracheal airway       Successful airway: ETT - single  Endotracheal Airway Details        ETT size (mm): 7.0       Cuffed: yes       Successful intubation technique: direct laryngoscopy       DL Blade Type: Arroyo 2       Grade View of Cords: 2       Adjucts: stylet       Position: Left       Measured from: gums/teeth       Secured at (cm): 22       Bite block used: Soft    Post intubation assessment        Placement verified by: capnometry, equal breath sounds and chest rise        Number of attempts at approach: 1       Secured with: silk tape       Ease of procedure: easy (slight anteiorly located glottic opening)       Dental guard used and removed. Dental Guard Type: Proguard Red.    Medication(s) Administered   Medication Administration Time: 12/5/2022 8:13 AM

## 2022-12-05 NOTE — ANESTHESIA PROCEDURE NOTES
Cervical Plexus (Superficial cervical plexus block) Procedure Note    Pre-Procedure   Staff -        Anesthesiologist:  Lance Thomas MD       Performed By: anesthesiologist       Location: pre-op       Procedure Start/Stop Times: 12/5/2022 7:20 AM and 12/5/2022 7:21 AM       Pre-Anesthestic Checklist: patient identified, IV checked, site marked, risks and benefits discussed, informed consent, monitors and equipment checked, pre-op evaluation, at physician/surgeon's request and post-op pain management  Timeout:       Correct Patient: Yes        Correct Procedure: Yes        Correct Site: Yes        Correct Position: Yes        Correct Laterality: Yes        Site Marked: Yes  Procedure Documentation  Procedure: Cervical Plexus (Superficial cervical plexus block)       Laterality: right       Patient Position: supine       Patient Prep/Sterile Barriers: sterile gloves, mask       Skin prep: Chloraprep       Needle Type: insulated       Needle Gauge: 20.        Needle Length (Inches): 4        Ultrasound guided       1. Ultrasound was used to identify targeted nerve, plexus, vascular marker, or fascial plane and place a needle adjacent to it in real-time.       2. Ultrasound was used to visualize the spread of anesthetic in close proximity to the above referenced structure.       3. A permanent image is entered into the patient's record.       4. The visualized anatomic structures appeared normal.       5. There were no apparent abnormal pathologic findings.    Assessment/Narrative         The placement was negative for: blood aspirated, painful injection and site bleeding       Paresthesias: No.       Bolus given via needle..        Secured via.        Insertion/Infusion Method: Single Shot       Complications: none       Injection made incrementally with aspirations every 5 mL.    Medication(s) Administered   Bupivacaine 0.5% w/ 1:200K Epi (Other) - Other   5 mL - 12/5/2022 7:20:00 AM  Medication Administration  "Time: 12/5/2022 7:20 AM      FOR [facility] ONLY:   Pain Team Contact information: please page the Pain Team Via [employer]. Search \"Pain\". During daytime hours, please page the attending first. At night please page the resident first.    "

## 2022-12-05 NOTE — OP NOTE
Preoperative diagnosis:  1.  Right proximal humerus fracture, three-part    Postoperative diagnosis:  1.  Right proximal humerus fracture, three-part    Procedure(s) performed:  1.  Right reverse total shoulder arthroplasty for proximal humerus fracture  2.  Biceps tenodesis  3.  Repair of greater and lesser tuberosities, right shoulder    Attending Surgeon: Raudel Mayo MD  Assistant: Siria Angulo PA-C (A skilled assistant was necessary for this procedure to help with patient positioning, manipulation and prep the surgical extremity, instrumentation, and safe/timely progress of the procedure.)    Anesthesia: General endotracheal plus regional block  IV fluids: See anesthesia record  Estimated blood loss: 250 mL    Specimens: None  Drains: None    Complications: None apparent  Disposition: Stable to PACU    Implant: Tornier revive humeral stem, size 9 with 20 proximal body.  Standard centered tray.  +6 poly.  25 mm standard baseplate.  36 mm +3 lateralized glenosphere.    Indications:  This patient is a 67-year-old female who sustained a closed displaced right proximal humerus fracture after a fall.  Images demonstrated a significantly displaced three-part proximal humerus fracture.  There was fracture involvement of the humeral head.  Given the degree of displacement, her otherwise good health and functional status, recommendation was made for surgery.  I discussed ORIF versus reverse.  Given the CT findings, I did recommend reverse shoulder arthroplasty.  She was interested in proceeding with this.  I reviewed the risks of surgery, specifically bleeding, infection, nerve damage, DVT or PE, anesthesia complications, additional fracture, need for additional surgery, anesthesia complications, and others.  No guarantees were made regarding a specific outcome. The patient indicated desire to proceed informed consent was obtained.    Operative findings:   Severely comminuted displaced proximal humerus fracture.   Lesser tuberosity was a separate piece.  There is some comminution humeral head.  Greater tuberosity had a nondisplaced fracture attached to the humeral head.    Procedure in detail:  The patient was identified in preoperative holding where the right shoulder was marked and surgical site was confirmed.  Regional block was administered by anesthesia with appropriate affect.  The patient was then brought to the operating room placed supine the table where general anesthetic was induced.  The patient was inclined into beachchair position with the head secured and all bony/neural prominences padded appropriately.  The right shoulder was examined under anesthesia, after which the left upper extremity was prepped and draped in the usual sterile fashion.  A presurgical timeout was completed.  Antibiotics were administered by anesthesia just prior to incision.    Standard deltopectoral approach was utilized.  The skin incision was made and skin flaps developed.  The cephalic vein was identified and mobilized, subsequently retracted laterally.  The deltopectoral interval was then entered and a self-retaining retractor was placed.  The fascia alongside the lateral border of the conjoined tendon was incised, and the medial blade of the retractor was placed deep to that.  I palpated the axillary nerve and it was protected throughout the procedure.  Fracture was identified and hematoma was evacuated.  I found the biceps as the landmark.  I tenodesed the biceps into the upper border the pectoralis major tendon utilizing a figure-of-eight Ethibond suture. I then transected the biceps tendon and followed this up into the rotator cuff interval.  I identified the fracture fragments.  The lesser tuberosity was a free fragment.  I tagged this with an Ethibond suture.  I then carefully mobilized this to gain control of the subscapularis and lesser tuberosity.  I then directed my attention to the greater tuberosity.  This was a  nondisplaced fracture attached to the humeral head.  I freed up the fracture line with a Rantoul and once free, I tagged the greater tuberosity and supraspinatus with a Ethibond suture.  Once freely mobilized, a pointed reduction forceps was utilized to capture the humeral head and remove it.  This was saved on the back table in case bone graft were needed later.    I turned my attention to the glenoid.  Bankart retractor was placed posteriorly.  Anterior capsule was released at which point a Bankart retractor was also placed anteriorly.  This gave great exposure of the entire glenoid. The remnant labrum was resected circumferentially.  Residual cartilage was scraped from the glenoid face with a Stone elevator.  A central pin was then placed utilizing the guide low within the glenoid.  This pin was placed bicortically.  I could palpate the tip of it coming out of the anterior vault just in front of scapular body.  Please with the location, I began reaming to create a flush surface. The glenoid was irrigated with pulse lavage.  The central screw was then subsequently drilled.  A 25 standard baseplate with central screw was then advanced and had excellent purchase.  I then drilled the anterior superior, posterior inferior, and anterior inferior screws and placed bicortical screws.  A 36 mm +3 lateralized glenosphere was then impacted onto the baseplate and tightened fully.  A Rantoul confirmed that this was well-seated.      The humerus was again exposed.  I began preparing the humerus.  A 9 mm diameter broach had sufficient purchase in the humeral diaphysis.  I then broached and trialed the standard humeral component.  This sat to distally.  I therefore built up the proximal body with a 20 mm spacer and retrialed.  Utilizing standard tray and +6 mm poly-, I reduced the shoulder.  This had excellent tension without any looseness.  Pleased with this, the shoulder was again dislocated and the components were removed.    I  prepared my tuberosity repair.  I passed 2 Nice loop sutures through the supraspinatus and infraspinatus for Sugar tuberosity fragment.  I placed 2 additional Nice loop sutures through the subscapularis.  The subscapularis Nice loops were then passed around the final humeral stem.  The greater tuberosity Nice loops were passed through drill holes distally.  The final humeral stem was then impacted with appropriate version and seated appropriately.  I then retrial and confirmed the standard tray and +6 poly.  The final tray and polywere then impacted on the humeral stem.  The shoulder was reduced a final time.  This had appropriate soft tissue tension.    I then completed my tuberosity repair.  The 2 Nice loops from the lesser tuberosity were then tensioned with racking half hitch knots and this nicely reduced the lesser tuberosity fragment down to the humeral stem and the native diaphysis.  I repeated the process with the 2 Nice loops through the greater tuberosity through drill holes distally.  This nicely reduced the greater tuberosity fragment down to the humeral diaphysis.  I then utilized a #2 Fiberwire suture and passed side to side stitches in figure-of-eight fashion between the lesser and greater tuberosity fragments for final repair.  I then ranged the shoulder shoulder and the repair of the tuberosities held nicely.    The joint was extensively irrigated with pulse lavage.  Approximately half of 1 g vancomycin powder was sprinkled intra-articularly for perioperative infection prophylaxis.  The deltopectoral interval was then closed with running 0 Vicryl stitch.  The remainder of the the vancomycin powder was then sprinkled into the wound.   Skin was closed in layers with 2-0 Vicryl and 3-0 running subcuticular Monocryl and skin glue.  A sterile, adhesive, waterproof bandage was applied.  The drapes were taken down and the arm was placed into a sling with abduction pillow.  Anesthesia was reversed and the  patient was taken to recovery in stable condition.  No complications were noted.  All sponge and needle counts were correct.    Postoperative plan:  NWB RUE, sling  OK for pendulums, elbow/wrist/hand ROM  24 hours post op ancef  DVT ppx: ASA 81 mg daily for 14 days, early ambulation, SCDs  Ca/Vit D supp  PT/OT    LEIDY REMY MD

## 2022-12-05 NOTE — BRIEF OP NOTE
[facility]    Brief Operative Note    Pre-operative diagnosis: Shoulder fracture, right [S42.91XA]  Post-operative diagnosis Same as pre-operative diagnosis    Procedure: Procedure(s):  RIGHT REVERSE TOTAL SHOULDER ARTHROPLASTY  Surgeon: Surgeon(s) and Role:     * Raudel Rajan MD - Primary     * Siria Angulo PA-C - Assisting  Anesthesia: General with Block   Estimated Blood Loss: 250 ml    Drains: None  Specimens:   ID Type Source Tests Collected by Time Destination   A :  Tissue Shoulder, Right OR DOCUMENTATION ONLY Raudel Rajan MD 12/5/2022  9:40 AM      Findings:   fracture.  Complications: None.  Implants:   Implant Name Type Inv. Item Serial No.  Lot No. LRB No. Used Action   BASEPLATE STD 25MM - [id number] Total Joint Component/Insert BASEPLATE STD 25MM [id number] [employer]  Right 1 Implanted   SCREW BSPLT 35MM 6.5MM AQLS PRFRM GLND RVRS CNTR NS [id number] - [id number] Metallic Hardware/[place] SCREW BSPLT 35MM 6.5MM AQLS PRFRM GLND RVRS CNTR NS [id number]  [employer] NA Right 1 Implanted   SCREW PERIPHERAL 26MM - [id number] Metallic Hardware/[place] SCREW PERIPHERAL 26MM  [employer] NA Right 1 Implanted   SCREW PERIPHERAL 18MM [id number] - [id number] Metallic Hardware/[place] SCREW PERIPHERAL 18MM [id number]  [employer] NA Right 1 Implanted   SCREW PERIPHERAL 22MM - [id number] Metallic Hardware/[place] SCREW PERIPHERAL 22MM  [employer] NA Right 1 Implanted   GLEOSPHERE LATERALIZED AP REVERSED 36MM - [id number] Total Joint Component/Insert GLEOSPHERE LATERALIZED AP REVERSED 36MM [id number] [employer]  Right 1 Implanted   nice loop     [id number] Right 1 Implanted   nice loop     [id number] Right 1 Implanted   nice loop     [id number] Right 1 Implanted   nice loop     [id number] Right 1 Implanted   BODY HUM 9MM AQLS FLX RV SHLDR PROX - [id number] Total Joint Component/Insert BODY HUM 9MM AQLS FLX RV SHLDR PROX [id number] [employer]  Right 1  Implanted   CAP END LCK AQLS FLX RV SHLDR [id number] - [id number] Metallic Hardware/Imogene CAP END LCK AQLS FLX RV SHLDR [id number] [employer]  Right 1 Implanted   SCREW BSPLT 20MM AQLS FLX RV SHLDR [id number] - [id number] Metallic Hardware/Imogene SCREW BSPLT 20MM AQLS FLX RV SHLDR [id number] [employer]  Right 1 Implanted   STEM HUM 90MM 9MM PRT COAT AQLS FLEX RV PTC SHLDR [id number] - [id number] Total Joint Component/Insert STEM HUM 90MM 9MM PRT COAT AQLS FLEX RV PTC SHLDR [id number] [employer]  Right 1 Implanted   SPACER HUM 20MM 9MM AQLS FLX RV SHLDR - [id number] Metallic Hardware/Imogene SPACER HUM 20MM 9MM AQLS FLX RV SHLDR [id number] [employer]  Right 1 Implanted   TRAY REVERSE CENTERED +0 - [id number] Total Joint Component/Insert TRAY REVERSE CENTERED +0 [id number] [employer]  Right 1 Implanted   INSERT REVISION REVERSE +6/12 36MM - [id number] Total Joint Component/Insert INSERT REVISION REVERSE +6/12 36MM [id number] [employer]  Right 1 Implanted         NWB RUE, sling  OK for pendulums, elbow/wrist/hand ROM  24 hours post op ancef  DVT ppx: ASA 81 mg daily for 14 days, early ambulation, SCDs  Ca/Vit D supp  PT/OT    LEIDY REMY MD

## 2022-12-05 NOTE — PLAN OF CARE
Patient vital signs are at baseline: No,  Reason:  pt on 1L O2 pt dropped to 88% when asleep between cares   Patient able to ambulate as they were prior to admission or with assist devices provided by therapies during their stay:  Yes  Patient MUST void prior to discharge:  Yes  Patient able to tolerate oral intake:  Yes  Pain has adequate pain control using Oral analgesics:  Yes, pt has denied pain this shift. Block still in place, pt is able to move fingers and hand   Does patient have an identified :  Yes  Has goal D/C date and time been discussed with patient:  Yes plan is home tomorrow

## 2022-12-06 ENCOUNTER — APPOINTMENT (OUTPATIENT)
Dept: OCCUPATIONAL THERAPY | Facility: CLINIC | Age: 67
End: 2022-12-06
Attending: STUDENT IN AN ORGANIZED HEALTH CARE EDUCATION/TRAINING PROGRAM
Payer: COMMERCIAL

## 2022-12-06 VITALS
HEIGHT: 66 IN | BODY MASS INDEX: 39.05 KG/M2 | TEMPERATURE: 97.8 F | HEART RATE: 76 BPM | DIASTOLIC BLOOD PRESSURE: 51 MMHG | WEIGHT: 243 LBS | SYSTOLIC BLOOD PRESSURE: 130 MMHG | RESPIRATION RATE: 18 BRPM | OXYGEN SATURATION: 92 %

## 2022-12-06 LAB
FASTING STATUS PATIENT QL REPORTED: NO
GLUCOSE BLD-MCNC: 166 MG/DL (ref 70–125)
HGB BLD-MCNC: 8.9 G/DL (ref 11.7–15.7)

## 2022-12-06 PROCEDURE — 250N000013 HC RX MED GY IP 250 OP 250 PS 637: Performed by: FAMILY MEDICINE

## 2022-12-06 PROCEDURE — 97535 SELF CARE MNGMENT TRAINING: CPT | Mod: GO

## 2022-12-06 PROCEDURE — 99214 OFFICE O/P EST MOD 30 MIN: CPT | Performed by: FAMILY MEDICINE

## 2022-12-06 PROCEDURE — 82947 ASSAY GLUCOSE BLOOD QUANT: CPT | Performed by: PHYSICIAN ASSISTANT

## 2022-12-06 PROCEDURE — 97110 THERAPEUTIC EXERCISES: CPT | Mod: GO

## 2022-12-06 PROCEDURE — 36415 COLL VENOUS BLD VENIPUNCTURE: CPT | Performed by: PHYSICIAN ASSISTANT

## 2022-12-06 PROCEDURE — 97166 OT EVAL MOD COMPLEX 45 MIN: CPT | Mod: GO

## 2022-12-06 PROCEDURE — 250N000013 HC RX MED GY IP 250 OP 250 PS 637: Performed by: PHYSICIAN ASSISTANT

## 2022-12-06 PROCEDURE — 250N000011 HC RX IP 250 OP 636: Performed by: PHYSICIAN ASSISTANT

## 2022-12-06 PROCEDURE — 85018 HEMOGLOBIN: CPT | Performed by: PHYSICIAN ASSISTANT

## 2022-12-06 RX ORDER — ONDANSETRON 4 MG/1
4 TABLET, ORALLY DISINTEGRATING ORAL EVERY 8 HOURS PRN
Qty: 5 TABLET | Refills: 0 | Status: SHIPPED | OUTPATIENT
Start: 2022-12-06

## 2022-12-06 RX ORDER — ASPIRIN 81 MG/1
81 TABLET ORAL DAILY
Qty: 14 TABLET | Refills: 0 | COMMUNITY
Start: 2022-12-06

## 2022-12-06 RX ORDER — OXYCODONE HYDROCHLORIDE 5 MG/1
2.5-5 TABLET ORAL EVERY 4 HOURS PRN
Qty: 30 TABLET | Refills: 0 | Status: SHIPPED | OUTPATIENT
Start: 2022-12-06

## 2022-12-06 RX ADMIN — ASPIRIN 81 MG: 81 TABLET, COATED ORAL at 09:32

## 2022-12-06 RX ADMIN — THERA TABS 1 TABLET: TAB at 09:32

## 2022-12-06 RX ADMIN — ACETAMINOPHEN 975 MG: 325 TABLET, FILM COATED ORAL at 06:00

## 2022-12-06 RX ADMIN — ACETAMINOPHEN 975 MG: 325 TABLET, FILM COATED ORAL at 12:20

## 2022-12-06 RX ADMIN — ACETAMINOPHEN 975 MG: 325 TABLET, FILM COATED ORAL at 00:47

## 2022-12-06 RX ADMIN — POLYETHYLENE GLYCOL 3350 17 G: 17 POWDER, FOR SOLUTION ORAL at 09:32

## 2022-12-06 RX ADMIN — CEFAZOLIN SODIUM 2 G: 2 INJECTION, SOLUTION INTRAVENOUS at 00:50

## 2022-12-06 RX ADMIN — SENNOSIDES AND DOCUSATE SODIUM 1 TABLET: 50; 8.6 TABLET ORAL at 09:32

## 2022-12-06 RX ADMIN — CHOLECALCIFEROL TAB 10 MCG (400 UNIT) 800 UNITS: 10 TAB at 09:45

## 2022-12-06 ASSESSMENT — ACTIVITIES OF DAILY LIVING (ADL)
ADLS_ACUITY_SCORE: 24
IADL_COMMENTS: FAMILY WILL DO UNTIL PT IS RECOVERED

## 2022-12-06 NOTE — DISCHARGE INSTRUCTIONS
Understanding Carbohydrates  A car needs the right type of fuel to run. And you need the right kind of food to function. To keep your energy level up, your body needs food that has carbohydrates. But carbs raise blood sugar levels higher and faster than other kinds of food. Your dietitian will work with you to figure out the amount of carbs you need. Carbs come in 3 types: starches, sugars, and fiber.   Starches  Starches are found in grains, some vegetables, and beans. Grain products include bread, pasta, cereal, and tortillas. Starchy vegetables include potatoes, peas, corn, lima beans, yams, and squash. Kidney beans, kinsey beans, black beans, garbanzo beans, and lentils also have starches.     Sugars  Sugars are found naturally in many foods. Or they can be added. Foods that contain natural sugar include fruits and fruit juices, dairy products, honey, and molasses. Added sugars are found in most desserts, processed foods, candy, regular soda, and fruit drinks. These are very helpful to treat low blood sugar (hypoglycemia). They give you sugar quickly. Try to keep at least 15 to 20 grams of these simple sugars with you at all times. Eat or drink these if you start to have symptoms of low blood sugar.   Fiber  Fiber comes from plant foods. Your body can't digest most fiber. Instead of raising blood sugar levels like other carbs, fiber stops blood sugar from rising too fast. Fiber is found in fruits, vegetables, whole grains, beans, peas, and many nuts.   Carb counting  Keep track of the amount of carbs you eat. This can help you keep the right balance of carbs, physical activity, and medicine. The amount of carbs you need will be different from what other people need. How much you need depends on many things. These include your health, the medicines you take, and how active you are. Your healthcare team will help you figure out the right amount of carbs for you. You may start with 45 to 60 grams of carbs per  meal, depending on your case. Carb counting is a system that helps you keep track of the carbohydrates you eat at each meal.   Carbs come from a variety of foods. These include grains, starchy vegetables, fruit, milk, beans, and snack foods. You can either count carbohydrate grams or carbohydrate servings. When you count carbohydrate servings, 1 carbohydrate serving = 15 grams of carbohydrates.   Here are some examples of foods that have about 15 grams of carbohydrates (1 serving of carbohydrates):   1/2 cup of canned or frozen fruit  A small piece of fresh fruit (4 ounces)  1 slice of bread  1/2 cup of oatmeal  1/3 cup of rice  4 to 6 crackers  1/2 English muffin  1/2 cup of black beans  1/4 of a large baked potato (3 ounces)  2/3 cup of plain fat-free yogurt  1 cup of soup  1/2 cup of casserole  6 chicken nuggets  2-inch-square brownie or cake without frosting  2 small cookies  1/2 cup of ice cream or sherbet  Carb counting is easier when food labels are available. Look at the label to see how many grams of total carbohydrates per serving the food contains. Then you can figure out how much you should eat. If your food doesn't have a nutrition label, you should be able to get an idea how many carbs there are per serving by using a book or website.   Two very important lines to look at on the label are the serving size and the total carbohydrate amount per serving. Here are some tips for using food labels to count your carbs:   Check the serving size. The information on the label is based on that serving size. If you eat more than the listed serving size, you may have to double or triple the other information on the label.   Check the total grams of carbohydrates. Total carbohydrate from the label includes sugar, starch, and fiber. Be sure to use the total carbohydrate number and not sugar alone.  Know how many grams of carbs you can have.  Be familiar with the matching portion sizes.  Compare labels. Compare the  labels of different products. Look at serving sizes and total carbs to find the products that work best for you.   Don't forget protein and fat. With the focus on carb counting, it might be easy to forget protein and fat in your meals. Don't forget to include sources of protein and healthy fat to balance your meals. Also watch how much salt (sodium) you eat. This is especially true if you have high blood pressure. If you have diabetes, limit the amount of sodium to less than 2,300 mg a day.  It s also important to be consistent with the amount of carbs and time you eat when taking a fixed dose of diabetes medicine. Work with your healthcare provider or dietitian if you need more help. He or she can help you keep track of your carbs. He or she can also help you figure out how many grams of carbs you should have.   [employer] last reviewed this educational content on 11/1/2019 2000-2021 [employer]. All rights reserved. This information is not intended as a substitute for professional medical care. Always follow your healthcare professional's instructions.

## 2022-12-06 NOTE — PROGRESS NOTES
Care Management Initial Consult    General Information  Assessment completed with: VM-chart review,    Type of CM/SW Visit: Chart Assessment      Additional Information:  SW reviewed chart.  Pt from home with sposue.  Anticipate Pt will return home to prior living environment.  CM following care progression to assist as needed.      KATHLEEN Pagan

## 2022-12-06 NOTE — PROGRESS NOTES
"Addendum: Hgb 8.9. Asymptomatic. Follow-up with PCP in 7-10 days for repeat hemoglobin. Okay to discharge.    Mouna Rios PA-C on 12/6/2022 at 12:19 PM      Orthopedic Progress Note      Assessment: 1 Day Post-Op  S/P Procedure(s):  RIGHT REVERSE TOTAL SHOULDER ARTHROPLASTY     Plan:   - PT/OT eval.  - Weightbearing status: NWB RUE, sling  - Anticoagulation: Aspirin 81 mg daily x14 days, in addition to SCDs, ruddy stockings and early ambulation.  - Discharge planning: Anticipate discharge to home today pending hgb, progress with PT/OT.     Subjective:  Pain: Block in effect, minimal right shoulder pain  Chest pain, SOB: no  Nausea, Vomiting:  no  Lightheadedness, Dizziness:  no  Neuro:  Some tingling in right fingers with block in effect    Patient doing well on POD #1. Pain well controlled with block in effect. Voiding. Tolerating oral intake. Has a 3 hour drive home, so would like to leave this am. Hgb hasn't been drawn yet.    Objective:  /51 (BP Location: Left arm)   Pulse 76   Temp 97.8  F (36.6  C) (Oral)   Resp 18   Ht 1.676 m (5' 6\")   Wt 110.2 kg (243 lb)   SpO2 92%   BMI 39.22 kg/m    The patient is A&Ox3. Appears comfortable. Wearing sling appropriately.  Sensation is intact to light touch in Right axillary, median, radial & ulnar nerve dermatomes, but reduced compared to left due to block.  Right upper extremity motor strength 5/5 in ulnar, median and radial nerve distributions. Flexes & extends wrist. Full composite fist. Opposes thumb.  Palpable Right radial pulse. Hand warm with brisk cap refill in fingers.  Calves are soft and non-tender.   Moderate ecchymosis (from prior fracture) and edema Right shoulder. The incision is covered. Dressing C/D/I.     Pertinent Labs   Lab Results: personally reviewed.   No results found for: INR, PROTIME  Lab Results   Component Value Date    WBC 6.9 05/07/2018    HGB 13.4 05/07/2018    HCT 40.5 05/07/2018    MCV 86 05/07/2018     05/07/2018 "     Lab Results   Component Value Date     05/07/2018    CO2 24 05/07/2018     Report completed by:  Mouna Rios PA-C  [facility]    Date: 12/6/2022  Time: 9:25 AM

## 2022-12-06 NOTE — DISCHARGE SUMMARY
ORTHOPEDIC DISCHARGE SUMMARY       Rachana Quinn,  1955, MRN [id number]    Admission Date: 2022      Admission Diagnoses: Shoulder fracture, right [S42.91XA]  S/p reverse total shoulder arthroplasty [Z96.619]     Discharge Date:  2022    Post-operative Day:  1 Day Post-Op    Reason for Admission: The patient was admitted for the following: Procedure(s):  RIGHT REVERSE TOTAL SHOULDER ARTHROPLASTY    BRIEF HOSPITAL COURSE   Rachana Quinn is a pleasant 67 year old female who underwent the aforementioned procedure with Dr. Mayo on 2022. There were no intraoperative complications and the patient was transferred to the recovery room and later the orthopedic unit in stable condition. Once the patient reached the orthopedic floor our orthopedic pain protocol was implemented along with the following:    Anticoagulation Medications: ASA 81 mg daily x14 days  Therapy: PT and OT  Activity: NWB  Bracing: Slingshot Brace    Consultations during Admission: Hospitalist service for medical management. Patient had acute blood loss anemia on POD #1 with hgb 8.9. She was asymptomatic and her vitals are stable. She should follow-up with her primary care provider in 7 days for a repeat hemoglobin.      COMPLICATIONS/SIGNIFICANT FINDINGS    None    DISCHARGE INFORMATION   Condition at discharge: Good  Discharge destination: Home  Patient was seen by myself on the date of discharge.    FOLLOW UP CARE   Follow up with orthopedics in 2 weeks or sooner should the need arise. Ortho will continue to manage pain control, post op anticoagulation and incision care.     Follow up with your PCP for management of chronic medical problems and to evaluate for post op medical complications including constipation, nausea/vomiting, DVT/PE, anemia, changes in blood pressure, fevers/chills, urinary retention and atelectasis/pneumonia.     Subjective   Patient is doing well on POD #1. Pain is well controlled with oral medications.  "Ambulating. Tolerating oral intake. Voiding.    Physical Exam   /51 (BP Location: Left arm)   Pulse 76   Temp 97.8  F (36.6  C) (Oral)   Resp 18   Ht 1.676 m (5' 6\")   Wt 110.2 kg (243 lb)   SpO2 92%   BMI 39.22 kg/m    The patient is A&Ox3. Appears comfortable. Wearing sling appropriately.  Sensation is intact to light touch in Right axillary, median, radial & ulnar nerve dermatomes, but reduced compared to left due to block.  Right upper extremity motor strength 5/5 in ulnar, median and radial nerve distributions. Flexes & extends wrist. Full composite fist. Opposes thumb.  Palpable Right radial pulse. Hand warm with brisk cap refill in fingers.  Calves are soft and non-tender.   Moderate ecchymosis (from prior fracture) and edema Right shoulder. The incision is covered. Dressing C/D/I.     Pertinent Results at Discharge     Hemoglobin   Date/Time Value Ref Range Status   05/07/2018 10:49 AM 13.4 12.0 - 16.0 g/dL Final     Platelet Count   Date/Time Value Ref Range Status   05/07/2018 10:49  140 - 440 thou/uL Final       Problem List   Active Problems:    Anxiety and depression    Restless leg syndrome    Mouna Rios PA-C/Dr. Mayo  [facility]  [phone]  Date: 12/6/2022  Time: 9:30 AM    "

## 2022-12-06 NOTE — PROGRESS NOTES
Occupational Therapy Discharge Summary    Reason for therapy discharge:    All goals and outcomes met, no further needs identified.    Progress towards therapy goal(s). See goals on Care Plan in [facility] electronic health record for goal details.  Goals met    Therapy recommendation(s):    No further therapy is recommended.

## 2022-12-06 NOTE — PROGRESS NOTES
"[facility] MEDICINE  PROGRESS NOTE     Code Status: Full Code  Procedure(s):  RIGHT REVERSE TOTAL SHOULDER ARTHROPLASTY  1 Day Post-Op  Identification/Summary:   Rachana Quinn is a 67 year old female with a PMH of RLS, anxiety depression, postmenopausal status and PONV.  Underwent right reverse total shoulder arthroplasty.  Preop hemoglobin 12.5 now down to 8.9.  No indication for transfusion at this time.  A.m. glucose still pending.     Assessment and Plan:  -Anxiety depression  Continue home Lexapro.  -Restless leg syndrome  Continue bedtime gabapentin.  -History of postoperative nausea vomiting  Symptoms under good control at this time.  -Acute blood loss anemia  Preoperative hemoglobin 12.5 now down to 8.9.  Vital signs stable.  No indication for transfusion at this time.  -Status post reverse right total shoulder arthroplasty  Still awaiting a.m. glucose.     COVID-19 PCR negative preoperative test  Noted.  Standard precautions.  Anticoagulation.    Aspirin 81 mg twice daily ordered by surgery.  Routine postoperative risk.  Fluids: Per orthopedics  Pain meds: Per orthopedics  Therapy: Per orthopedics  Rios:Not present  Current Diet  Orders Placed This Encounter      Advance Diet as Tolerated: Regular Diet Adult      Discharge Instruction - Regular Diet Adult    Supplements  None    Barriers to Discharge: AM glucose    Disposition: Anticipate discharge home later today    Addendum 10:07 AM    Hyperglycemia  AM glucose back at 166.  Just mildly elevated.  Recommend annual glucose testing.    Medically cleared for discharge.  Discharge med rec reviewed and our area of responsibility was addressed.    Brennan Zuniga MD  12/6/2022  10:07 AM    Clinically Significant Risk Factors Present on Admission                      # Obesity: Estimated body mass index is 39.22 kg/m  as calculated from the following:    Height as of this encounter: 1.676 m (5' 6\").    Weight as of this " encounter: 110.2 kg (243 lb).           Interval History/Subjective:  Patient doing well.  No chest pain.  No shortness of breath.  No lightheadedness or dizziness.  No nausea or vomiting.  Hoping to discharge home later today.  Pain under reasonable control.  Questions answered to verbalized satisfaction.    Physical Exam/Objective:  Temp:  [97.6  F (36.4  C)-99  F (37.2  C)] 97.8  F (36.6  C)  Pulse:  [66-78] 76  Resp:  [14-23] 18  BP: (114-147)/(51-74) 130/51  SpO2:  [88 %-98 %] 92 %  Wt Readings from Last 4 Encounters:   12/05/22 110.2 kg (243 lb)   07/03/18 111.1 kg (245 lb)   05/07/18 107.1 kg (236 lb 1 oz)   08/10/17 102.5 kg (226 lb)     Body mass index is 39.22 kg/m .    Constitutional: awake, alert, cooperative, no apparent distress, and appears stated age and morbidly obese  ENT: Normocephalic, without obvious abnormality, atraumatic, external ears without lesions, oral pharynx with moist mucous membranes, tonsils without erythema or exudates, gums normal and good dentition.  Respiratory: No increased work of breathing, good air exchange, clear to auscultation bilaterally, no crackles or wheezing  Cardiovascular: Normal apical impulse, regular rate and rhythm, normal S1 and S2, no S3 or S4, and no murmur noted  GI: No scars, normal bowel sounds, soft, non-distended, non-tender, no masses palpated, no hepatosplenomegally  Skin: normal skin color, texture, turgor, no redness, warmth, or swelling, and no rashes  Musculoskeletal: Limited exam as right arm is in a sling but good function and  strength bilaterally.  There is no redness, warmth, or swelling of the joints.  Motor strength is 5 out of 5 all extremities bilaterally.  Tone is normal. no lower extremity pitting edema present  Neurologic: Cranial nerves II-XII are grossly intact. Sensory:  Sensory intact  Neuropsychiatric: General: normal, calm and normal eye contact Level of consciousness: alert / normal Affect: normal Orientation: oriented to  "self, place, time and situation Memory and insight: normal, memory for past and recent events intact and thought process normal      Medications:   Personally Reviewed.  Medications     bupivacaine liposome (EXPAREL) LA inj was given in the infiltration site to produce post-op analgesia. Duration of action is up to 72 hours. Other \"rod\" meds should not be given for 96 hours except for lidocaine 4% patch. This is for INFORMATION ONLY.       lactated ringers 75 mL/hr at 12/06/22 0058       acetaminophen  975 mg Oral Q8H     aspirin  81 mg Oral BID     cholecalciferol  800 Units Oral Daily     escitalopram  10 mg Oral At Bedtime     gabapentin  200 mg Oral At Bedtime     magnesium oxide  400 mg Oral At Bedtime     multivitamin, therapeutic  1 tablet Oral Daily     polyethylene glycol  17 g Oral Daily     scopolamine   Transdermal Q8H     senna-docusate  1 tablet Oral BID     sodium chloride (PF)  3 mL Intracatheter Q8H       Data reviewed today: I personally reviewed all new medications, labs, imaging/diagnostics reports over the past 24 hours. Pertinent findings include:    Imaging:   Recent Results (from the past 24 hour(s))   XR Shoulder Right Port G/E 2 Views    Narrative    EXAM: XR SHOULDER RIGHT PORT G/E 2 VIEWS  LOCATION: [facility]  DATE/TIME: 12/5/2022 12:19 PM    INDICATION: Status post surgery  COMPARISON: None available.      Impression    IMPRESSION: Postoperative changes reverse glenohumeral joint replacement with longstem humeral component. There is a fracture through the proximal humerus at the junction with the greater tuberosity, mildly displaced. This is best seen along the lateral   cortex. No dislocation.       Labs:  XR Shoulder Right Port G/E 2 Views   Final Result   IMPRESSION: Postoperative changes reverse glenohumeral joint replacement with longstem humeral component. There is a fracture through the proximal humerus at the junction with the greater tuberosity, " mildly displaced. This is best seen along the lateral    cortex. No dislocation.      POC US Guidance Needle Placement   Final Result      POC US Guidance Needle Placement   Final Result        Recent Results (from the past 24 hour(s))   Hemoglobin    Collection Time: 12/06/22  9:29 AM   Result Value Ref Range    Hemoglobin 8.9 (L) 11.7 - 15.7 g/dL       Pending Labs:  Unresulted Labs Ordered in the Past 30 Days of this Admission     Date and Time Order Name Status Description    12/6/2022 12:02 AM Glucose In process           Brennan Zuniga MD  [facility]  Phone: #[phone]

## 2022-12-06 NOTE — PLAN OF CARE
Problem: Plan of Care - These are the overarching goals to be used throughout the patient stay.    Goal: Absence of Hospital-Acquired Illness or Injury  Intervention: Identify and Manage Fall Risk  Recent Flowsheet Documentation  Taken 12/5/2022 2350 by Opal Sanchez RN  Safety Promotion/Fall Prevention:    activity supervised    clutter free environment maintained    fall prevention program maintained    nonskid shoes/slippers when out of bed    room near nurse's station     Problem: Plan of Care - These are the overarching goals to be used throughout the patient stay.    Goal: Absence of Hospital-Acquired Illness or Injury  Intervention: Prevent Skin Injury  Recent Flowsheet Documentation  Taken 12/5/2022 2350 by Opal Sanchez RN  Body Position: position changed independently     Problem: Shoulder Arthroplasty (Total, Deven, Reverse)  Goal: Acceptable Pain Control  Intervention: Prevent or Manage Pain  Recent Flowsheet Documentation  Taken 12/5/2022 2350 by Opal Sanchez RN  Pain Management Interventions:    emotional support    repositioned    rest   Goal Outcome Evaluation:    Pt. VSS 1L NC, A&OX4, numbness and tingling in rt arm and hand, Rt. Shoulder sling. Only pain reported was during infusion at the IV site, but showed no signs of infiltration, currently SL, reg diet, reg. Voiding, ambulate w/ SBA, and gait belt, reports SOB during ambulation, 1 L NC while she sleeps, Labs in the am of glucose and hemoglobin, possible Discharge today pending on OT consult at 9:45am    Patient vital signs are at baseline: Yes  Patient able to ambulate as they were prior to admission or with assist devices provided by therapies during their stay:  Yes  Patient MUST void prior to discharge:  Yes  Patient able to tolerate oral intake:  Yes  Pain has adequate pain control using Oral analgesics:  Yes  Does patient have an identified :  Yes  Has goal D/C date and time been discussed with patient:   Yes

## 2022-12-06 NOTE — PLAN OF CARE
Goal Outcome Evaluation:      Plan of Care Reviewed With: patient  Reviewed discharge instructions with patient. Discussed Low Carbohydrate Diet and the need to follow up with primary MD to check a Fasting Blood Glucose Level.Discussed Orthopedic Discharge orders. All questions answered and patient was happy to be going home. Patient was medicated with Tylenol 975 mg po prior to being discharged and 1 3 hour ride home. Patient transported to the [facility] via a wheelchair.

## 2022-12-06 NOTE — PROGRESS NOTES
12/06/22 1000   Appointment Info   Signing Clinician's Name / Credentials (OT) Gloria Pena, OTR/L   Quick Adds   Quick Adds Certification   Living Environment   People in Home spouse   Current Living Arrangements house   Living Environment Comments has rts, walk in shower with chair   Self-Care   Usual Activity Tolerance good   Current Activity Tolerance moderate   Instrumental Activities of Daily Living (IADL)   IADL Comments Family will do until pt is recovered   General Information   Onset of Illness/Injury or Date of Surgery 12/05/22   Referring Physician Raudel Rajan   Patient/Family Therapy Goal Statement (OT) heal well   Additional Occupational Profile Info/Pertinent History of Current Problem Pt fell and fractured her right shoulder-had right reverse TSA yesterday.  has h/o anxiety and depression   Existing Precautions/Restrictions shoulder   Right Upper Extremity (Weight-bearing Status) non weight-bearing (NWB)   Cognitive Status Examination   Affect/Mental Status (Cognitive) WNL   Range of Motion Comprehensive   Comment, General Range of Motion right arm in immobilizer   Strength Comprehensive (MMT)   Comment, General Manual Muscle Testing (MMT) Assessment pt is right handed   Bed Mobility   Comment (Bed Mobility) pt will sleep in a recliner initially   Transfers   Transfer Comments sba   Activities of Daily Living   BADL Assessment/Intervention upper body dressing;lower body dressing;toileting   Upper Body Dressing Assessment/Training   Dukes Level (Upper Body Dressing) moderate assist (50% patient effort)   Lower Body Dressing Assessment/Training   Dukes Level (Lower Body Dressing) minimum assist (75% patient effort)   Toileting   Dukes Level (Toileting) minimum assist (75% patient effort)   Clinical Impression   Criteria for Skilled Therapeutic Interventions Met (OT) Yes, treatment indicated   OT Diagnosis Decreased independence with adls   OT Problem List-Impairments impacting  ADL post-surgical precautions   Assessment of Occupational Performance 3-5 Performance Deficits   Identified Performance Deficits upper and lower body dressing, toileting, bed mob, transfers,   Planned Therapy Interventions (OT) ADL retraining;bed mobility training;transfer training;home program guidelines;progressive activity/exercise   Clinical Decision Making Complexity (OT) moderate complexity   Risk & Benefits of therapy have been explained evaluation/treatment results reviewed;care plan/treatment goals reviewed;risks/benefits reviewed;current/potential barriers reviewed;participants voiced agreement with care plan;participants included;patient   OT Total Evaluation Time   OT Eval, Moderate Complexity Minutes (21594) 15   Therapy Certification   Medical Diagnosis right reverse TSA   Start of Care Date 12/06/22   Certification date from 12/06/22   Certification date to 12/27/22   OT Goals   Therapy Frequency (OT) One time eval and treatment   OT Goals Upper Body Dressing;Lower Body Dressing;Toilet Transfer/Toileting;OT Goal 1   OT: Upper Body Dressing Minimal assist;within precautions;Goal Met;Completed   OT: Lower Body Dressing Modified independent;within precautions;Goal Met;Completed   OT: Toilet Transfer/Toileting Modified independent;within precautions;toilet transfer;cleaning and garment management;Goal Met;Completed   OT: Goal 1 Pt will complete home ex program:   Codman s, elbow, wrist and hand with mod I, met, completed   Interventions   Interventions Quick Adds Self-Care/Home Management;Therapeutic Procedures/Exercise   Self-Care/Home Management   Self-Care/Home Mgmt/ADL, Compensatory, Meal Prep Minutes (13574) 30   Symptoms Noted During/After Treatment (Meal Preparation/Planning Training) none   Treatment Detail/Skilled Intervention Taught pt. how to doff and don immobilizer, dress upper and lower body, complete hygiene tasks, transfer to toilet, bed, chair, and car all while using aamir techniques.    Pt is min assist  with upper body dressing and mod i with everything else after OT intervention.  Pt will have assist at home as needed from her family. Answered questions from pt. Handouts given.   Therapeutic Procedures/Exercise   Therapeutic Procedure: strength, endurance, ROM, flexibillity minutes (59074) 15   Symptoms Noted During/After Treatment none   Treatment Detail/Skilled Intervention Taught pt. how to perform Codmans, elbow, wrist and hand AROM ex s.  Gave handout.  Answered questions to pts satisfaction.   OT Discharge Planning   OT Plan dc OT   OT Discharge Recommendation (DC Rec) (S)  home with assist   OT Rationale for DC Rec Pt has good support at home   OT Brief overview of current status Pt needs min assist for uppe rbody dressing and is mod i with lower body dressing, toileting and func mobility after OT intervention   Total Session Time   Timed Code Treatment Minutes 45   Total Session Time (sum of timed and untimed services) 60    M [facility]  OUTPATIENT OCCUPATIONAL THERAPY  EVALUATION  PLAN OF TREATMENT FOR OUTPATIENT REHABILITATION  (COMPLETE FOR INITIAL CLAIMS ONLY)  Patient's Last Name, First Name, M.I.  YOB: 1955  Rachana Quinn                          Provider's Name  [facility] Medical Record No.  [id number]                             Onset Date:  12/05/22   Start of Care Date:  12/06/22   Type:     ___PT   _X_OT   ___SLP Medical Diagnosis:  right reverse TSA                    OT Diagnosis:  Decreased independence with adls Visits from SOC:  1     See note for plan of treatment, functional goals and certification details    I CERTIFY THE NEED FOR THESE SERVICES FURNISHED UNDER        THIS PLAN OF TREATMENT AND WHILE UNDER MY CARE     (Physician co-signature of this document indicates review and certification of the therapy plan).

## (undated) DEVICE — SOL NACL 0.9% IRRIG 1000ML BOTTLE 2F7124

## (undated) DEVICE — SOL WATER IRRIG 1000ML BOTTLE 2F7114

## (undated) DEVICE — SU ETHIBOND 2 V-37 4X30" MX69G

## (undated) DEVICE — SUCTION IRR SYSTEM W/O TIP INTERPULSE HANDPIECE 0210-100-000

## (undated) DEVICE — SUTURE VICRYL+ 0 27IN CT-1 UND VCP260H

## (undated) DEVICE — BLADE SAW SAGITTAL STRK WIDE 25.4X85X1.2MM 2108-151-000

## (undated) DEVICE — KIT SHOULDER POSITIONING BEACH CHAIR ARM HOLDER AR-1644

## (undated) DEVICE — ALCOHOL ISOPROPYL 4 OZ 70% IA7004

## (undated) DEVICE — DRAPE IOBAN INCISE 23X17" 6650EZ

## (undated) DEVICE — GLOVE BIOGEL PI ULTRATOUCH G SZ 7.5 42175

## (undated) DEVICE — DRSG GAUZE 4X4" TRAY 6939

## (undated) DEVICE — GLOVE UNDER INDICATOR PI SZ 7.0 LF 41670

## (undated) DEVICE — DRAPE CONVERTORS U-DRAPE 60X72" 8476

## (undated) DEVICE — HYDROGEN PEROXIDE 4 OZ HP0304

## (undated) DEVICE — DRAPE STERI U 1015

## (undated) DEVICE — CUSTOM PACK OPEN SHOULDER SOP5BOSHEB

## (undated) DEVICE — A3 SUPPLIES- SEE NURSING INFO PAGE

## (undated) DEVICE — DRILL BIT PERIPHERAL SCREW 3.2MM MWJ126

## (undated) DEVICE — SUTURE VICRYL+ 2-0 27IN CT-1 UND VCP259H

## (undated) DEVICE — PREP CHLORAPREP W/ORANGE TINT 10.5ML 930715

## (undated) DEVICE — SUCTION MANIFOLD NEPTUNE 2 SYS 1 PORT 702-025-000

## (undated) DEVICE — MAT FLOOR SURGICAL 40X38 0702140238

## (undated) DEVICE — ADH SKIN CLOSURE PREMIERPRO EXOFIN 1.0ML 3470

## (undated) DEVICE — HOLDER LIMB VELCRO OR 0814-1533

## (undated) DEVICE — Device

## (undated) DEVICE — BONE CLEANING TIP INTERPULSE  0210-010-000

## (undated) DEVICE — POSITIONER SCHLEIN KIT BC-1000X

## (undated) DEVICE — PLATE GROUNDING ADULT W/CORD 9165L

## (undated) DEVICE — GLOVE BIOGEL PI ULTRATOUCH G SZ 7.0 42170

## (undated) DEVICE — DRSG AQUACEL AG HYDROFIBER  3.5X10" 422605

## (undated) DEVICE — GUIDEWIRE TORNIER AEQUALIS PERFORM +  2.5X220MM DWD017

## (undated) DEVICE — GLOVE BIOGEL PI INDICATOR 8.0 LF 41680

## (undated) DEVICE — GOWN IMPERVIOUS BREATHABLE SMART XLG 89045

## (undated) DEVICE — SOL NACL 0.9% INJ 1000ML BAG 2B1324X

## (undated) RX ORDER — FENTANYL CITRATE-0.9 % NACL/PF 10 MCG/ML
PLASTIC BAG, INJECTION (ML) INTRAVENOUS
Status: DISPENSED
Start: 2022-12-05

## (undated) RX ORDER — GLYCOPYRROLATE 0.2 MG/ML
INJECTION INTRAMUSCULAR; INTRAVENOUS
Status: DISPENSED
Start: 2022-12-05

## (undated) RX ORDER — LIDOCAINE HYDROCHLORIDE 10 MG/ML
INJECTION, SOLUTION EPIDURAL; INFILTRATION; INTRACAUDAL; PERINEURAL
Status: DISPENSED
Start: 2022-12-05

## (undated) RX ORDER — DEXAMETHASONE SODIUM PHOSPHATE 10 MG/ML
INJECTION, EMULSION INTRAMUSCULAR; INTRAVENOUS
Status: DISPENSED
Start: 2022-12-05

## (undated) RX ORDER — FENTANYL CITRATE 50 UG/ML
INJECTION, SOLUTION INTRAMUSCULAR; INTRAVENOUS
Status: DISPENSED
Start: 2022-12-05

## (undated) RX ORDER — CEFAZOLIN SODIUM 1 G/3ML
INJECTION, POWDER, FOR SOLUTION INTRAMUSCULAR; INTRAVENOUS
Status: DISPENSED
Start: 2022-12-05

## (undated) RX ORDER — PROPOFOL 10 MG/ML
INJECTION, EMULSION INTRAVENOUS
Status: DISPENSED
Start: 2022-12-05